# Patient Record
Sex: FEMALE | Race: WHITE | URBAN - METROPOLITAN AREA
[De-identification: names, ages, dates, MRNs, and addresses within clinical notes are randomized per-mention and may not be internally consistent; named-entity substitution may affect disease eponyms.]

---

## 2017-01-06 ENCOUNTER — TRANSFERRED RECORDS (OUTPATIENT)
Dept: HEALTH INFORMATION MANAGEMENT | Facility: CLINIC | Age: 52
End: 2017-01-06

## 2017-01-09 PROBLEM — E78.00 HYPERCHOLESTEREMIA: Status: ACTIVE | Noted: 2017-01-09

## 2017-01-09 PROBLEM — E66.9 OBESITY (BMI 30-39.9): Status: ACTIVE | Noted: 2017-01-09

## 2017-01-09 PROBLEM — C54.1 ENDOMETRIAL CANCER (H): Status: ACTIVE | Noted: 2017-01-09

## 2017-01-09 PROBLEM — Z86.73 H/O: CVA (CEREBROVASCULAR ACCIDENT): Status: ACTIVE | Noted: 2017-01-09

## 2017-01-09 PROBLEM — G43.909 MIGRAINE: Status: ACTIVE | Noted: 2017-01-09

## 2017-01-09 PROBLEM — K21.9 GERD (GASTROESOPHAGEAL REFLUX DISEASE): Status: ACTIVE | Noted: 2017-01-09

## 2017-01-09 PROBLEM — G47.33 SLEEP APNEA, OBSTRUCTIVE: Status: ACTIVE | Noted: 2017-01-09

## 2017-01-09 PROBLEM — F32.A DEPRESSION: Status: ACTIVE | Noted: 2017-01-09

## 2017-02-15 ENCOUNTER — TRANSFERRED RECORDS (OUTPATIENT)
Dept: HEALTH INFORMATION MANAGEMENT | Facility: CLINIC | Age: 52
End: 2017-02-15

## 2017-02-17 DIAGNOSIS — D50.9 IRON DEFICIENCY ANEMIA, UNSPECIFIED: Primary | ICD-10-CM

## 2017-02-17 PROBLEM — K90.9 INTESTINAL MALABSORPTION, UNSPECIFIED: Status: ACTIVE | Noted: 2017-02-17

## 2017-02-17 PROBLEM — T45.4X5A ADVERSE EFFECT OF IRON: Status: ACTIVE | Noted: 2017-02-17

## 2017-02-20 ENCOUNTER — INFUSION THERAPY VISIT (OUTPATIENT)
Dept: INFUSION THERAPY | Facility: CLINIC | Age: 52
End: 2017-02-20
Attending: PATHOLOGY
Payer: COMMERCIAL

## 2017-02-20 VITALS — TEMPERATURE: 98.8 F | DIASTOLIC BLOOD PRESSURE: 84 MMHG | SYSTOLIC BLOOD PRESSURE: 134 MMHG | HEART RATE: 66 BPM

## 2017-02-20 DIAGNOSIS — D50.9 IRON DEFICIENCY ANEMIA, UNSPECIFIED: Primary | ICD-10-CM

## 2017-02-20 DIAGNOSIS — K90.9 INTESTINAL MALABSORPTION, UNSPECIFIED: ICD-10-CM

## 2017-02-20 DIAGNOSIS — T45.4X5A ADVERSE EFFECT OF IRON: ICD-10-CM

## 2017-02-20 PROCEDURE — 96361 HYDRATE IV INFUSION ADD-ON: CPT

## 2017-02-20 PROCEDURE — 25000128 H RX IP 250 OP 636: Performed by: PATHOLOGY

## 2017-02-20 PROCEDURE — 96365 THER/PROPH/DIAG IV INF INIT: CPT

## 2017-02-20 RX ADMIN — FERRIC CARBOXYMALTOSE INJECTION 750 MG: 50 INJECTION, SOLUTION INTRAVENOUS at 12:19

## 2017-02-20 RX ADMIN — SODIUM CHLORIDE 250 ML: 9 INJECTION, SOLUTION INTRAVENOUS at 12:19

## 2017-02-20 ASSESSMENT — PAIN SCALES - GENERAL: PAINLEVEL: NO PAIN (0)

## 2017-02-20 NOTE — MR AVS SNAPSHOT
"              After Visit Summary   2/20/2017    Rosalia Schulz    MRN: [id number]           Patient Information     Date Of Birth          1965        Visit Information        Provider Department      2/20/2017 12:30 PM [facility] [address] [facility]        Today's Diagnoses     Iron deficiency anemia, unspecified    -  1    Intestinal malabsorption, unspecified        Adverse effect of iron           Follow-ups after your visit        Your next 10 appointments already scheduled     Feb 23, 2017   Procedure with Tiffanie Weldon MD   [facility] (--)    [address]  [facility] [address] [phone]              Who to contact     If you have questions or need follow up information about today's clinic visit or your schedule please contact [facility] AND [facility] directly at [phone].  Normal or non-critical lab and imaging results will be communicated to you by MyChart, letter or phone within 4 business days after the clinic has received the results. If you do not hear from us within 7 days, please contact the clinic through MyChart or phone. If you have a critical or abnormal lab result, we will notify you by phone as soon as possible.  Submit refill requests through [employer] or call your pharmacy and they will forward the refill request to us. Please allow 3 business days for your refill to be completed.          Additional Information About Your Visit        MyChart Information     [employer] lets you send messages to your doctor, view your test results, renew your prescriptions, schedule appointments and more. To sign up, go to [email or web address] . Click on \"Log in\" on the left side of the screen, which will take you to the Welcome page. Then click on \"Sign up Now\" on the right side of the page.     You will be asked to enter the access code listed below, as well as some personal information. Please follow " the directions to create your username and password.     Your access code is: [email or web address]  Expires: 2017  1:36 PM     Your access code will  in 90 days. If you need help or a new code, please call your [place] clinic or [phone].        Care EveryWhere ID     This is your Care EveryWhere ID. This could be used by other organizations to access your [place] medical records  [id number]        Your Vitals Were     Pulse Temperature                66 98.8  F (37.1  C)           Blood Pressure from Last 3 Encounters:   17 134/84    Weight from Last 3 Encounters:   No data found for Wt              Today, you had the following     No orders found for display       Primary Care Provider    None Specified       No primary provider on file.        Thank you!     Thank you for choosing [facility]  for your care. Our goal is always to provide you with excellent care. Hearing back from our patients is one way we can continue to improve our services. Please take a few minutes to complete the written survey that you may receive in the mail after your visit with us. Thank you!             Your Updated Medication List - Protect others around you: Learn how to safely use, store and throw away your medicines at www.disposemymeds.org.          This list is accurate as of: 17  1:36 PM.  Always use your most recent med list.                   Brand Name Dispense Instructions for use    ASPIRIN PO      Take 81 mg by mouth daily       ATORVASTATIN CALCIUM PO      Take 10 mg by mouth daily       OMEPRAZOLE PO      Take 20 mg by mouth every morning

## 2017-02-20 NOTE — PROGRESS NOTES
Infusion Nursing Note:  Rosalia Schulz presents today for injectafer infusion.    Patient seen by provider today: No   present during visit today: Not Applicable.    Note: N/A.    Intravenous Access:  Peripheral IV placed.    Treatment Conditions:  Not Applicable.      Post Infusion Assessment:  Patient tolerated infusion without incident.    Discharge Plan:   Patient and/or family verbalized understanding of discharge instructions and all questions answered.  Copy of AVS reviewed with patient and/or family.  Patient will return PRN for next appointment.  Patient discharged in stable condition accompanied by: self and .  Departure Mode: Ambulatory.  Face to Face time: 15 min.    Zoya Webb RN

## 2017-02-22 ENCOUNTER — ANESTHESIA EVENT (OUTPATIENT)
Dept: SURGERY | Facility: CLINIC | Age: 52
End: 2017-02-22
Payer: COMMERCIAL

## 2017-02-22 RX ORDER — FLUTICASONE PROPIONATE 50 MCG
2 SPRAY, SUSPENSION (ML) NASAL DAILY
Status: ON HOLD | COMMUNITY
End: 2017-03-06

## 2017-02-23 ENCOUNTER — ANESTHESIA (OUTPATIENT)
Dept: SURGERY | Facility: CLINIC | Age: 52
End: 2017-02-23
Payer: COMMERCIAL

## 2017-02-23 ENCOUNTER — HOSPITAL ENCOUNTER (OUTPATIENT)
Facility: CLINIC | Age: 52
Discharge: HOME | End: 2017-02-23
Attending: OBSTETRICS & GYNECOLOGY | Admitting: OBSTETRICS & GYNECOLOGY
Payer: COMMERCIAL

## 2017-02-23 VITALS
WEIGHT: 219.2 LBS | BODY MASS INDEX: 35.23 KG/M2 | RESPIRATION RATE: 16 BRPM | SYSTOLIC BLOOD PRESSURE: 124 MMHG | TEMPERATURE: 97.1 F | HEIGHT: 66 IN | HEART RATE: 67 BPM | DIASTOLIC BLOOD PRESSURE: 78 MMHG | OXYGEN SATURATION: 97 %

## 2017-02-23 DIAGNOSIS — C54.1 ENDOMETRIAL CANCER (H): Primary | ICD-10-CM

## 2017-02-23 LAB
ABO + RH BLD: NORMAL
ABO + RH BLD: NORMAL
BLD GP AB SCN SERPL QL: NORMAL
BLOOD BANK CMNT PATIENT-IMP: NORMAL
HCG SERPL QL: NEGATIVE
HGB BLD-MCNC: 12 G/DL (ref 11.7–15.7)
SPECIMEN EXP DATE BLD: NORMAL

## 2017-02-23 PROCEDURE — 86901 BLOOD TYPING SEROLOGIC RH(D): CPT | Performed by: ANESTHESIOLOGY

## 2017-02-23 PROCEDURE — 37000009 ZZH ANESTHESIA TECHNICAL FEE, EACH ADDTL 15 MIN: Performed by: OBSTETRICS & GYNECOLOGY

## 2017-02-23 PROCEDURE — 88331 PATH CONSLTJ SURG 1 BLK 1SPC: CPT | Mod: 26 | Performed by: OBSTETRICS & GYNECOLOGY

## 2017-02-23 PROCEDURE — 88341 IMHCHEM/IMCYTCHM EA ADD ANTB: CPT | Mod: 26,59 | Performed by: OBSTETRICS & GYNECOLOGY

## 2017-02-23 PROCEDURE — 88309 TISSUE EXAM BY PATHOLOGIST: CPT | Performed by: OBSTETRICS & GYNECOLOGY

## 2017-02-23 PROCEDURE — 88112 CYTOPATH CELL ENHANCE TECH: CPT | Performed by: OBSTETRICS & GYNECOLOGY

## 2017-02-23 PROCEDURE — 71000013 ZZH RECOVERY PHASE 1 LEVEL 1 EA ADDTL HR: Performed by: OBSTETRICS & GYNECOLOGY

## 2017-02-23 PROCEDURE — 88342 IMHCHEM/IMCYTCHM 1ST ANTB: CPT | Performed by: OBSTETRICS & GYNECOLOGY

## 2017-02-23 PROCEDURE — 84703 CHORIONIC GONADOTROPIN ASSAY: CPT | Performed by: OBSTETRICS & GYNECOLOGY

## 2017-02-23 PROCEDURE — 88341 IMHCHEM/IMCYTCHM EA ADD ANTB: CPT | Performed by: OBSTETRICS & GYNECOLOGY

## 2017-02-23 PROCEDURE — 88342 IMHCHEM/IMCYTCHM 1ST ANTB: CPT | Mod: 26,59 | Performed by: OBSTETRICS & GYNECOLOGY

## 2017-02-23 PROCEDURE — 88305 TISSUE EXAM BY PATHOLOGIST: CPT | Performed by: OBSTETRICS & GYNECOLOGY

## 2017-02-23 PROCEDURE — 88332 PATH CONSLTJ SURG EA ADD BLK: CPT | Performed by: OBSTETRICS & GYNECOLOGY

## 2017-02-23 PROCEDURE — 25000128 H RX IP 250 OP 636: Performed by: NURSE ANESTHETIST, CERTIFIED REGISTERED

## 2017-02-23 PROCEDURE — 25000128 H RX IP 250 OP 636: Performed by: ANESTHESIOLOGY

## 2017-02-23 PROCEDURE — 36000085 ZZH SURGERY LEVEL 8 1ST 30 MIN: Performed by: OBSTETRICS & GYNECOLOGY

## 2017-02-23 PROCEDURE — 88332 PATH CONSLTJ SURG EA ADD BLK: CPT | Mod: 26 | Performed by: OBSTETRICS & GYNECOLOGY

## 2017-02-23 PROCEDURE — 88307 TISSUE EXAM BY PATHOLOGIST: CPT | Mod: 26 | Performed by: OBSTETRICS & GYNECOLOGY

## 2017-02-23 PROCEDURE — 00000155 ZZHCL STATISTIC H-CELL BLOCK W/STAIN: Performed by: OBSTETRICS & GYNECOLOGY

## 2017-02-23 PROCEDURE — 25800025 ZZH RX 258: Performed by: NURSE ANESTHETIST, CERTIFIED REGISTERED

## 2017-02-23 PROCEDURE — 36415 COLL VENOUS BLD VENIPUNCTURE: CPT | Performed by: OBSTETRICS & GYNECOLOGY

## 2017-02-23 PROCEDURE — 85018 HEMOGLOBIN: CPT | Performed by: ANESTHESIOLOGY

## 2017-02-23 PROCEDURE — 71000012 ZZH RECOVERY PHASE 1 LEVEL 1 FIRST HR: Performed by: OBSTETRICS & GYNECOLOGY

## 2017-02-23 PROCEDURE — 27210794 ZZH OR GENERAL SUPPLY STERILE: Performed by: OBSTETRICS & GYNECOLOGY

## 2017-02-23 PROCEDURE — 88307 TISSUE EXAM BY PATHOLOGIST: CPT | Performed by: OBSTETRICS & GYNECOLOGY

## 2017-02-23 PROCEDURE — 25000125 ZZHC RX 250: Performed by: NURSE ANESTHETIST, CERTIFIED REGISTERED

## 2017-02-23 PROCEDURE — 86850 RBC ANTIBODY SCREEN: CPT | Performed by: ANESTHESIOLOGY

## 2017-02-23 PROCEDURE — 25000566 ZZH SEVOFLURANE, EA 15 MIN: Performed by: OBSTETRICS & GYNECOLOGY

## 2017-02-23 PROCEDURE — 25000125 ZZHC RX 250: Performed by: SURGERY

## 2017-02-23 PROCEDURE — 88305 TISSUE EXAM BY PATHOLOGIST: CPT | Mod: 26 | Performed by: OBSTETRICS & GYNECOLOGY

## 2017-02-23 PROCEDURE — 25000132 ZZH RX MED GY IP 250 OP 250 PS 637: Performed by: NURSE PRACTITIONER

## 2017-02-23 PROCEDURE — 88305 TISSUE EXAM BY PATHOLOGIST: CPT | Mod: 26,59 | Performed by: OBSTETRICS & GYNECOLOGY

## 2017-02-23 PROCEDURE — 40000170 ZZH STATISTIC PRE-PROCEDURE ASSESSMENT II: Performed by: OBSTETRICS & GYNECOLOGY

## 2017-02-23 PROCEDURE — 88331 PATH CONSLTJ SURG 1 BLK 1SPC: CPT | Performed by: OBSTETRICS & GYNECOLOGY

## 2017-02-23 PROCEDURE — 86900 BLOOD TYPING SEROLOGIC ABO: CPT | Performed by: ANESTHESIOLOGY

## 2017-02-23 PROCEDURE — 25000128 H RX IP 250 OP 636: Performed by: SURGERY

## 2017-02-23 PROCEDURE — 25000125 ZZHC RX 250: Performed by: OBSTETRICS & GYNECOLOGY

## 2017-02-23 PROCEDURE — 88309 TISSUE EXAM BY PATHOLOGIST: CPT | Mod: 26 | Performed by: OBSTETRICS & GYNECOLOGY

## 2017-02-23 PROCEDURE — 71000027 ZZH RECOVERY PHASE 2 EACH 15 MINS: Performed by: OBSTETRICS & GYNECOLOGY

## 2017-02-23 PROCEDURE — 25000128 H RX IP 250 OP 636: Performed by: OBSTETRICS & GYNECOLOGY

## 2017-02-23 PROCEDURE — 36000087 ZZH SURGERY LEVEL 8 EA 15 ADDTL MIN: Performed by: OBSTETRICS & GYNECOLOGY

## 2017-02-23 PROCEDURE — 37000008 ZZH ANESTHESIA TECHNICAL FEE, 1ST 30 MIN: Performed by: OBSTETRICS & GYNECOLOGY

## 2017-02-23 PROCEDURE — 25000132 ZZH RX MED GY IP 250 OP 250 PS 637: Performed by: OBSTETRICS & GYNECOLOGY

## 2017-02-23 PROCEDURE — 88112 CYTOPATH CELL ENHANCE TECH: CPT | Mod: 26 | Performed by: OBSTETRICS & GYNECOLOGY

## 2017-02-23 RX ORDER — ONDANSETRON 4 MG/1
4 TABLET, ORALLY DISINTEGRATING ORAL EVERY 30 MIN PRN
Status: DISCONTINUED | OUTPATIENT
Start: 2017-02-23 | End: 2017-02-23 | Stop reason: HOSPADM

## 2017-02-23 RX ORDER — IBUPROFEN 600 MG/1
600 TABLET, FILM COATED ORAL EVERY 6 HOURS PRN
Qty: 30 TABLET | Refills: 0 | Status: SHIPPED | OUTPATIENT
Start: 2017-02-23

## 2017-02-23 RX ORDER — FENTANYL CITRATE 0.05 MG/ML
25-50 INJECTION, SOLUTION INTRAMUSCULAR; INTRAVENOUS
Status: DISCONTINUED | OUTPATIENT
Start: 2017-02-23 | End: 2017-02-23 | Stop reason: HOSPADM

## 2017-02-23 RX ORDER — OXYCODONE AND ACETAMINOPHEN 5; 325 MG/1; MG/1
1-2 TABLET ORAL EVERY 4 HOURS PRN
Qty: 30 TABLET | Refills: 0 | Status: ON HOLD | OUTPATIENT
Start: 2017-02-23 | End: 2017-03-06

## 2017-02-23 RX ORDER — GLYCOPYRROLATE 0.2 MG/ML
INJECTION, SOLUTION INTRAMUSCULAR; INTRAVENOUS PRN
Status: DISCONTINUED | OUTPATIENT
Start: 2017-02-23 | End: 2017-02-23

## 2017-02-23 RX ORDER — FENTANYL CITRATE 50 UG/ML
INJECTION, SOLUTION INTRAMUSCULAR; INTRAVENOUS PRN
Status: DISCONTINUED | OUTPATIENT
Start: 2017-02-23 | End: 2017-02-23

## 2017-02-23 RX ORDER — NALOXONE HYDROCHLORIDE 0.4 MG/ML
.1-.4 INJECTION, SOLUTION INTRAMUSCULAR; INTRAVENOUS; SUBCUTANEOUS
Status: DISCONTINUED | OUTPATIENT
Start: 2017-02-23 | End: 2017-02-23 | Stop reason: HOSPADM

## 2017-02-23 RX ORDER — MEPERIDINE HYDROCHLORIDE 25 MG/ML
12.5 INJECTION INTRAMUSCULAR; INTRAVENOUS; SUBCUTANEOUS
Status: DISCONTINUED | OUTPATIENT
Start: 2017-02-23 | End: 2017-02-23 | Stop reason: HOSPADM

## 2017-02-23 RX ORDER — EPHEDRINE SULFATE 50 MG/ML
INJECTION, SOLUTION INTRAMUSCULAR; INTRAVENOUS; SUBCUTANEOUS PRN
Status: DISCONTINUED | OUTPATIENT
Start: 2017-02-23 | End: 2017-02-23

## 2017-02-23 RX ORDER — SODIUM CHLORIDE, SODIUM LACTATE, POTASSIUM CHLORIDE, CALCIUM CHLORIDE 600; 310; 30; 20 MG/100ML; MG/100ML; MG/100ML; MG/100ML
INJECTION, SOLUTION INTRAVENOUS CONTINUOUS
Status: DISCONTINUED | OUTPATIENT
Start: 2017-02-23 | End: 2017-02-23 | Stop reason: HOSPADM

## 2017-02-23 RX ORDER — CEFAZOLIN SODIUM 1 G/3ML
1 INJECTION, POWDER, FOR SOLUTION INTRAMUSCULAR; INTRAVENOUS SEE ADMIN INSTRUCTIONS
Status: DISCONTINUED | OUTPATIENT
Start: 2017-02-23 | End: 2017-02-23 | Stop reason: HOSPADM

## 2017-02-23 RX ORDER — PROPOFOL 10 MG/ML
INJECTION, EMULSION INTRAVENOUS PRN
Status: DISCONTINUED | OUTPATIENT
Start: 2017-02-23 | End: 2017-02-23

## 2017-02-23 RX ORDER — DEXMEDETOMIDINE HYDROCHLORIDE 100 UG/ML
INJECTION, SOLUTION INTRAVENOUS CONTINUOUS PRN
Status: DISCONTINUED | OUTPATIENT
Start: 2017-02-23 | End: 2017-02-23

## 2017-02-23 RX ORDER — ACETAMINOPHEN 10 MG/ML
1000 INJECTION, SOLUTION INTRAVENOUS ONCE
Status: COMPLETED | OUTPATIENT
Start: 2017-02-23 | End: 2017-02-23

## 2017-02-23 RX ORDER — AMOXICILLIN 250 MG
1-2 CAPSULE ORAL 2 TIMES DAILY
Qty: 30 TABLET | Refills: 0 | Status: ON HOLD | OUTPATIENT
Start: 2017-02-23 | End: 2017-03-06

## 2017-02-23 RX ORDER — PROPOFOL 10 MG/ML
INJECTION, EMULSION INTRAVENOUS CONTINUOUS PRN
Status: DISCONTINUED | OUTPATIENT
Start: 2017-02-23 | End: 2017-02-23

## 2017-02-23 RX ORDER — CEFAZOLIN SODIUM 2 G/100ML
2 INJECTION, SOLUTION INTRAVENOUS
Status: COMPLETED | OUTPATIENT
Start: 2017-02-23 | End: 2017-02-23

## 2017-02-23 RX ORDER — MULTIPLE VITAMINS W/ MINERALS TAB 9MG-400MCG
1 TAB ORAL DAILY
COMMUNITY

## 2017-02-23 RX ORDER — OXYCODONE AND ACETAMINOPHEN 5; 325 MG/1; MG/1
1-2 TABLET ORAL
Status: COMPLETED | OUTPATIENT
Start: 2017-02-23 | End: 2017-02-23

## 2017-02-23 RX ORDER — SODIUM CHLORIDE, SODIUM LACTATE, POTASSIUM CHLORIDE, CALCIUM CHLORIDE 600; 310; 30; 20 MG/100ML; MG/100ML; MG/100ML; MG/100ML
INJECTION, SOLUTION INTRAVENOUS CONTINUOUS PRN
Status: DISCONTINUED | OUTPATIENT
Start: 2017-02-23 | End: 2017-02-23

## 2017-02-23 RX ORDER — BUPIVACAINE HYDROCHLORIDE AND EPINEPHRINE 5; 5 MG/ML; UG/ML
INJECTION, SOLUTION PERINEURAL PRN
Status: DISCONTINUED | OUTPATIENT
Start: 2017-02-23 | End: 2017-02-23 | Stop reason: HOSPADM

## 2017-02-23 RX ORDER — LABETALOL HYDROCHLORIDE 5 MG/ML
10 INJECTION, SOLUTION INTRAVENOUS
Status: DISCONTINUED | OUTPATIENT
Start: 2017-02-23 | End: 2017-02-23 | Stop reason: HOSPADM

## 2017-02-23 RX ORDER — ONDANSETRON 2 MG/ML
INJECTION INTRAMUSCULAR; INTRAVENOUS PRN
Status: DISCONTINUED | OUTPATIENT
Start: 2017-02-23 | End: 2017-02-23

## 2017-02-23 RX ORDER — KETOROLAC TROMETHAMINE 30 MG/ML
30 INJECTION, SOLUTION INTRAMUSCULAR; INTRAVENOUS ONCE
Status: COMPLETED | OUTPATIENT
Start: 2017-02-23 | End: 2017-02-23

## 2017-02-23 RX ORDER — IBUPROFEN 600 MG/1
600 TABLET, FILM COATED ORAL
Status: DISCONTINUED | OUTPATIENT
Start: 2017-02-23 | End: 2017-02-23 | Stop reason: HOSPADM

## 2017-02-23 RX ORDER — OXYCODONE HYDROCHLORIDE 5 MG/1
5 TABLET ORAL ONCE
Status: COMPLETED | OUTPATIENT
Start: 2017-02-23 | End: 2017-02-23

## 2017-02-23 RX ORDER — ONDANSETRON 4 MG/1
4 TABLET, ORALLY DISINTEGRATING ORAL
Status: DISCONTINUED | OUTPATIENT
Start: 2017-02-23 | End: 2017-02-23 | Stop reason: HOSPADM

## 2017-02-23 RX ORDER — DEXAMETHASONE SODIUM PHOSPHATE 4 MG/ML
INJECTION, SOLUTION INTRA-ARTICULAR; INTRALESIONAL; INTRAMUSCULAR; INTRAVENOUS; SOFT TISSUE PRN
Status: DISCONTINUED | OUTPATIENT
Start: 2017-02-23 | End: 2017-02-23

## 2017-02-23 RX ORDER — LIDOCAINE HYDROCHLORIDE 20 MG/ML
INJECTION, SOLUTION INFILTRATION; PERINEURAL PRN
Status: DISCONTINUED | OUTPATIENT
Start: 2017-02-23 | End: 2017-02-23

## 2017-02-23 RX ORDER — NEOSTIGMINE METHYLSULFATE 1 MG/ML
VIAL (ML) INJECTION PRN
Status: DISCONTINUED | OUTPATIENT
Start: 2017-02-23 | End: 2017-02-23

## 2017-02-23 RX ORDER — ONDANSETRON 2 MG/ML
4 INJECTION INTRAMUSCULAR; INTRAVENOUS EVERY 30 MIN PRN
Status: DISCONTINUED | OUTPATIENT
Start: 2017-02-23 | End: 2017-02-23 | Stop reason: HOSPADM

## 2017-02-23 RX ADMIN — LIDOCAINE HYDROCHLORIDE 80 MG: 20 INJECTION, SOLUTION INFILTRATION; PERINEURAL at 09:45

## 2017-02-23 RX ADMIN — FENTANYL CITRATE 150 MCG: 50 INJECTION, SOLUTION INTRAMUSCULAR; INTRAVENOUS at 10:11

## 2017-02-23 RX ADMIN — HYDROMORPHONE HYDROCHLORIDE 0.5 MG: 1 INJECTION, SOLUTION INTRAMUSCULAR; INTRAVENOUS; SUBCUTANEOUS at 12:58

## 2017-02-23 RX ADMIN — SUCCINYLCHOLINE CHLORIDE 100 MG: 20 INJECTION, SOLUTION INTRAMUSCULAR; INTRAVENOUS at 09:45

## 2017-02-23 RX ADMIN — GLYCOPYRROLATE 0.8 MG: 0.2 INJECTION, SOLUTION INTRAMUSCULAR; INTRAVENOUS at 11:54

## 2017-02-23 RX ADMIN — KETOROLAC TROMETHAMINE 30 MG: 30 INJECTION, SOLUTION INTRAMUSCULAR at 13:17

## 2017-02-23 RX ADMIN — DEXMEDETOMIDINE HYDROCHLORIDE 0.5 MCG/KG/HR: 4 INJECTION, SOLUTION INTRAVENOUS at 09:45

## 2017-02-23 RX ADMIN — PROPOFOL 200 MG: 10 INJECTION, EMULSION INTRAVENOUS at 09:45

## 2017-02-23 RX ADMIN — FENTANYL CITRATE 25 MCG: 50 INJECTION, SOLUTION INTRAMUSCULAR; INTRAVENOUS at 11:47

## 2017-02-23 RX ADMIN — ROCURONIUM BROMIDE 50 MG: 10 INJECTION INTRAVENOUS at 09:54

## 2017-02-23 RX ADMIN — OXYCODONE HYDROCHLORIDE 5 MG: 5 TABLET ORAL at 15:48

## 2017-02-23 RX ADMIN — PROPOFOL 50 MCG/KG/MIN: 10 INJECTION, EMULSION INTRAVENOUS at 09:45

## 2017-02-23 RX ADMIN — MIDAZOLAM HYDROCHLORIDE 2 MG: 1 INJECTION, SOLUTION INTRAMUSCULAR; INTRAVENOUS at 09:45

## 2017-02-23 RX ADMIN — OXYCODONE HYDROCHLORIDE AND ACETAMINOPHEN 1 TABLET: 5; 325 TABLET ORAL at 14:38

## 2017-02-23 RX ADMIN — DEXAMETHASONE SODIUM PHOSPHATE 4 MG: 4 INJECTION, SOLUTION INTRAMUSCULAR; INTRAVENOUS at 09:49

## 2017-02-23 RX ADMIN — SODIUM CHLORIDE, POTASSIUM CHLORIDE, SODIUM LACTATE AND CALCIUM CHLORIDE: 600; 310; 30; 20 INJECTION, SOLUTION INTRAVENOUS at 09:45

## 2017-02-23 RX ADMIN — SODIUM CHLORIDE, POTASSIUM CHLORIDE, SODIUM LACTATE AND CALCIUM CHLORIDE: 600; 310; 30; 20 INJECTION, SOLUTION INTRAVENOUS at 09:53

## 2017-02-23 RX ADMIN — HYDROMORPHONE HYDROCHLORIDE 0.5 MG: 1 INJECTION, SOLUTION INTRAMUSCULAR; INTRAVENOUS; SUBCUTANEOUS at 12:40

## 2017-02-23 RX ADMIN — ONDANSETRON 4 MG: 2 INJECTION INTRAMUSCULAR; INTRAVENOUS at 11:42

## 2017-02-23 RX ADMIN — Medication 5 MG: at 10:10

## 2017-02-23 RX ADMIN — NEOSTIGMINE METHYLSULFATE 5 MG: 1 INJECTION INTRAMUSCULAR; INTRAVENOUS; SUBCUTANEOUS at 11:54

## 2017-02-23 RX ADMIN — CEFAZOLIN SODIUM 2 G: 2 INJECTION, SOLUTION INTRAVENOUS at 09:49

## 2017-02-23 RX ADMIN — ACETAMINOPHEN 1000 MG: 10 INJECTION, SOLUTION INTRAVENOUS at 13:11

## 2017-02-23 RX ADMIN — ROCURONIUM BROMIDE 30 MG: 10 INJECTION INTRAVENOUS at 10:10

## 2017-02-23 RX ADMIN — Medication 5 MG: at 10:06

## 2017-02-23 RX ADMIN — FENTANYL CITRATE 100 MCG: 50 INJECTION, SOLUTION INTRAMUSCULAR; INTRAVENOUS at 09:45

## 2017-02-23 NOTE — IP AVS SNAPSHOT
MRN:[id number]                      After Visit Summary   2/23/2017    Rosalia Schulz    MRN: [id number]           Thank you!     Thank you for choosing [facility] for your care. Our goal is always to provide you with excellent care. Hearing back from our patients is one way we can continue to improve our services. Please take a few minutes to complete the written survey that you may receive in the mail after you visit with us. Thank you!        Patient Information     Date Of Birth          1965        About your hospital stay     You were admitted on:  February 23, 2017 You last received care in the:  [facility] PACU    You were discharged on:  February 23, 2017       Who to Call     For medical emergencies, please call 911.  For non-urgent questions about your medical care, please call your primary care provider or clinic, [phone]  For questions related to your surgery, please call your surgery clinic        Attending Provider     Provider Specialty    Tiffanie Weldon MD Oncology       Primary Care Provider Office Phone # Fax #    Joby Hammonds PA-C [phone]       [facility] [address]  [facility] [address]        After Care Instructions     Discharge Instructions       Discharge instructions following your gynecologic procedure:  Returning to work/activities:  -Nothing per vagina for 8 weeks  -Typically patients can expect to return to light work within 2-4 weeks.  -No driving or operating machinery while taking prescription pain medication.  -You should avoid heavy lifting until your follow up visit. No lifting greater than 20 pounds for 2 weeks.     Diet:  -as tolerated    Pain:  -Motrin (or other NSAIDs) are a good additional therapy and recommend trying this in addition to other pain relievers.  -Typically there is a minimal to moderate amount of incisional pain after surgery.  - An ice pack is recommended intermittently for the  first 48 hours to help reduce pain & swelling.  -Most patients are provided a prescription for medication to take on a short term basis to help relieve the discomfort.  -If pain medication refills are needed we require you contact our office during regular business hours. (8am-5pm Monday-Friday)  -Please be aware that pain medication can cause constipation.  It may be recommended to take an over the counter stool softener as directed to prevent constipation.     Constipation:  -The pain medication you are prescribed at the time of your surgery can cause constipation.   -We recommend that you take an over the counter stool softener such as colace or senokot-s on a regular basis until you have stopped the pain medication or bowel movements are regular. You make take 1-4 tablets twice per day.   -For constipation lasting 3 days please take Milk of Magnesia or Miralax as directed on the bottles. If you have taken Milk of Magnesia and Miralax and still have not had a bowel movement please contact your our office.    Incision/Wound care:  -Leave your steri-strips in place until your post op visit.   -If you have a clear plastic dressing over a gauze on your incision, remove these 1-2 days after discharging from the hospital.   -You many shower 24hrs after surgery.  It is ok to get the steri-strips/incision wet while showering & pat the area dry with a clean towel  -No bathtubs, hot tubs or swimming is recommended for at least 2 weeks.    Vaginal drainage/spotting:  -Following a hysterectomy you may have vaginal drainage/spotting for up to 4-6 weeks from surgery. If more than a regular period please call our office.     Bladder symptoms:  -You may also have bladder irritation of difficulty starting urination from your surgery and this is normal. Please call if you have pain or burning when you urinate or fevers.     Follow up care:  -You will be asked to see Dr. Weldon's Nurse Practitioner in 1 week and Dr. Weldon in 8  weeks.   -If you don't already have an appointment, please contact the office and our staff will happily assist you in scheduling your appointment. Bring your insurance card & government issued ID card to your appointment.    CALL YOUR SURGEON @[phone] FOR ANY OF THE FOLLOWING:  -Temperature greater than 101 degrees Fahrenheit  -Increased pain  -Increased shortness of breath  -Increased bleeding or drainage or vaginal bleeding greater than a regular menses.   -Pus-like drainage, increasing redness, swelling, tenderness, or warmth at the incision site  -Persistent nausea or vomiting                  Further instructions from your care team       Same Day Surgery Discharge Instructions for  Sedation and General Anesthesia       It's not unusual to feel dizzy, light-headed or faint for up to 24 hours after surgery or while taking pain medication.  If you have these symptoms: sit for a few minutes before standing and have someone assist you when you get up to walk or use the bathroom.      You should rest and relax for the next 24 hours. We recommend you make arrangements to have an adult stay with you for at least 24 hours after your discharge.  Avoid hazardous and strenuous activity.      DO NOT DRIVE any vehicle or operate mechanical equipment for 24 hours following the end of your surgery.  Even though you may feel normal, your reactions may be affected by the medication you have received.      Do not drink alcoholic beverages for 24 hours following surgery.       Slowly progress to your regular diet as you feel able. It's not unusual to feel nauseated and/or vomit after receiving anesthesia.  If you develop these symptoms, drink clear liquids (apple juice, ginger ale, broth, 7-up, etc. ) until you feel better.  If your nausea and vomiting persists for 24 hours, please notify your surgeon.        All narcotic pain medications, along with inactivity and anesthesia, can cause constipation. Drinking plenty of  liquids and increasing fiber intake will help.      For any questions of a medical nature, call your surgeon.      Do not make important decisions for 24 hours.      If you had general anesthesia, you may have a sore throat for a couple of days related to the breathing tube used during surgery.  You may use Cepacol lozenges to help with this discomfort.  If it worsens or if you develop a fever, contact your surgeon.       If you feel your pain is not well managed with the pain medications prescribed by your surgeon, please contact your surgeon's office to let them know so they can address your concerns.     **If you have questions or concerns about your procedure,   call Dr. Weldon [phone]**      Discharge Instructions for Laparoscopic or Davinci Hysterectomy    Incisional Care  A small amount of drainage from your incisions is normal. You may wear Band Aids until the drainage stops. The day after surgery, if your incisions are dry, remove the Band Aids. Leave the Steri-Strips (small pieces of tape) in place. Let them fall off on their own, or gently remove them after 7 days.  Once your have removed your Band Aids, you may shower as usual. Wash your incisions with mild soap and water. Pat dry.  Don't use oils, powders, or lotions on your incisions.  General Care  Take your medications exactly as directed by your doctor.    You may have vaginal bleeding that can last for several weeks. Use pads only. Don't put anything in your vagina (tampons, douches or have sexual intercourse) until your doctor says it's safe to do so.  Avoid constipation.  Eat fruits, vegetables, and whole grains.  Drink 6-8 glasses of water a day, unless told to do otherwise.  Use a laxative or a mild stool softener if your doctor says it's okay.  Activity  Don't drive while you are still taking narcotic pain medications.  Don t do strenuous activities until the doctor says it's okay.  Walk as often as you feel able.    Pain  Your incisions  "may be tender or sore. You may also have pain in your upper back or shoulders. This is from the gas used to enlarge your abdomen to allow your doctor to see inside your pelvis and perform the procedure. This pain usually goes away in a day or two.   When to Call Your Doctor  Call your doctor right away if you have any of the following:  Fever above 100.4 F (38 C) or chills  Vaginal bleeding that soaks more than one sanitary pad per hour  A foul smelling discharge from the vagina  Difficulty urinating  Severe pain   Redness, swelling, or drainage at your incision sites  Follow-Up  Make a follow-up appointment as directed by your surgeon.      While you were at the hospital today you were given 1000 mg of Tylenol. The recommended daily maximum dose is 4000 mg.     While you were at the hospital today you received Toradol, an antiinflammatory medication similar to Ibuprofen.  You should not take other antiinflammatory medication, such as Ibuprofen, Motrin, Advil, Aleve, Naprosyn, etc, until  7:20PM     **Because you had anesthesia today and your history of sleep apnea, it is extremely important that you use your CPAP machine for the next 24 hours while napping or sleeping.**      Pending Results     Date and Time Order Name Status Description    2/23/2017 1046 Surgical pathology exam In process     2/23/2017 1016 Cytology non gyn In process             Admission Information     Date & Time Provider Department Dept. Phone    2/23/2017 Tiffanie Weldon MD [facility] PACU [phone]      Your Vitals Were     Blood Pressure Temperature Respirations Height Weight Pulse Oximetry    118/67 96.8  F (36  C) (Temporal) 12 1.676 m (5' 6\") 99.4 kg (219 lb 3.2 oz) 92%    BMI (Body Mass Index)                   35.38 kg/m2           MyChart Information     [employer] lets you send messages to your doctor, view your test results, renew your prescriptions, schedule appointments and more. To sign up, go to " "[email or web address] . Click on \"Log in\" on the left side of the screen, which will take you to the Welcome page. Then click on \"Sign up Now\" on the right side of the page.     You will be asked to enter the access code listed below, as well as some personal information. Please follow the directions to create your username and password.     Your access code is: [email or web address]  Expires: 2017  1:36 PM     Your access code will  in 90 days. If you need help or a new code, please call your [place] clinic or [phone].        Care EveryWhere ID     This is your Care EveryWhere ID. This could be used by other organizations to access your [place] medical records  [id number]           Review of your medicines      START taking        Dose / Directions    oxyCODONE-acetaminophen 5-325 MG per tablet   Commonly known as:  PERCOCET   Used for:  Endometrial cancer (H)        Dose:  1-2 tablet   Take 1-2 tablets by mouth every 4 hours as needed for pain (moderate to severe)   Quantity:  30 tablet   Refills:  0       senna-docusate 8.6-50 MG per tablet   Commonly known as:  SENOKOT-S;PERICOLACE   Used for:  Endometrial cancer (H)        Dose:  1-2 tablet   Take 1-2 tablets by mouth 2 times daily Take while on oral narcotics to prevent or treat constipation.   Quantity:  30 tablet   Refills:  0         CONTINUE these medicines which may have CHANGED, or have new prescriptions. If we are uncertain of the size of tablets/capsules you have at home, strength may be listed as something that might have changed.        Dose / Directions    ibuprofen 600 MG tablet   Commonly known as:  ADVIL/MOTRIN   This may have changed:    - medication strength  - how much to take  - when to take this  - reasons to take this   Used for:  Endometrial cancer (H)        Dose:  600 mg   Take 1 tablet (600 mg) by mouth every 6 hours as needed for pain (mild)   Quantity:  30 tablet   Refills:  0         CONTINUE these medicines which have " NOT CHANGED        Dose / Directions    ASPIRIN PO   Indication:  Cerebrovascular Accident or Stroke        Dose:  81 mg   Take 81 mg by mouth daily   Refills:  0       ATORVASTATIN CALCIUM PO        Dose:  10 mg   Take 10 mg by mouth daily   Refills:  0       fluticasone 50 MCG/ACT spray   Commonly known as:  FLONASE        Dose:  2 spray   Spray 2 sprays into both nostrils daily   Refills:  0       IMITREX PO        Dose:  50 mg   Take 50 mg by mouth May repeat after two hours.  Maximum dose 200 mg/24 hours   Refills:  0       Multi-vitamin Tabs tablet        Dose:  1 tablet   Take 1 tablet by mouth daily   Refills:  0       OMEPRAZOLE PO        Dose:  20 mg   Take 20 mg by mouth every morning   Refills:  0       PROZAC PO        Dose:  40 mg   Take 40 mg by mouth daily   Refills:  0            Where to get your medicines      Some of these will need a paper prescription and others can be bought over the counter. Ask your nurse if you have questions.     Bring a paper prescription for each of these medications     ibuprofen 600 MG tablet    oxyCODONE-acetaminophen 5-325 MG per tablet    senna-docusate 8.6-50 MG per tablet                Protect others around you: Learn how to safely use, store and throw away your medicines at www.disposemymeds.org.             Medication List: This is a list of all your medications and when to take them. Check marks below indicate your daily home schedule. Keep this list as a reference.      Medications           Morning Afternoon Evening Bedtime As Needed    ASPIRIN PO   Take 81 mg by mouth daily                                ATORVASTATIN CALCIUM PO   Take 10 mg by mouth daily                                fluticasone 50 MCG/ACT spray   Commonly known as:  FLONASE   Spray 2 sprays into both nostrils daily                                ibuprofen 600 MG tablet   Commonly known as:  ADVIL/MOTRIN   Take 1 tablet (600 mg) by mouth every 6 hours as needed for pain (mild)                                 IMITREX PO   Take 50 mg by mouth May repeat after two hours.  Maximum dose 200 mg/24 hours                                Multi-vitamin Tabs tablet   Take 1 tablet by mouth daily                                OMEPRAZOLE PO   Take 20 mg by mouth every morning                                oxyCODONE-acetaminophen 5-325 MG per tablet   Commonly known as:  PERCOCET   Take 1-2 tablets by mouth every 4 hours as needed for pain (moderate to severe)   Last time this was given:  1 tablet on 2/23/2017  2:38 PM                                PROZAC PO   Take 40 mg by mouth daily                                senna-docusate 8.6-50 MG per tablet   Commonly known as:  SENOKOT-S;PERICOLACE   Take 1-2 tablets by mouth 2 times daily Take while on oral narcotics to prevent or treat constipation.

## 2017-02-23 NOTE — ANESTHESIA POSTPROCEDURE EVALUATION
Patient: Rosalia Schulz    Procedure(s):  ROBOTIC ASSISTED TOTAL LAPAROSCOPIC HYSTERECTOMY, BILATERAL SALPINGOO-OOPHORECTOMY, LYMPHADENECTOMY, BILATERAL     - Wound Class: II-Clean Contaminated   - Wound Class: II-Clean Contaminated    Diagnosis:ENDOMETRIAL CANCER  Diagnosis Additional Information: No value filed.    Anesthesia Type:  General, RSI, ETT    Note:  Anesthesia Post Evaluation    Patient location during evaluation: PACU  Patient participation: Able to fully participate in evaluation  Level of consciousness: awake and alert  Pain management: satisfactory to patient  Airway patency: patent  Cardiovascular status: hemodynamically stable  Respiratory status: acceptable and unassisted  Hydration status: balanced  PONV: none     Anesthetic complications: None          Last vitals:  Vitals:    02/23/17 1430 02/23/17 1445 02/23/17 1510   BP: 118/67 121/72    Resp: 12 14    Temp:  36.2  C (97.1  F)    SpO2: 92% 93% 95%         Electronically Signed By: Barron King MD  February 23, 2017  4:06 PM

## 2017-02-23 NOTE — IP AVS SNAPSHOT
[address], [address]    [facility] [address]    Phone:  [phone]                                       After Visit Summary   2/23/2017    Rosalia Schulz    MRN: [id number]           After Visit Summary Signature Page     I have received my discharge instructions, and my questions have been answered. I have discussed any challenges I see with this plan with the nurse or doctor.    ..........................................................................................................................................  Patient/Patient Representative Signature      ..........................................................................................................................................  Patient Representative Print Name and Relationship to Patient    ..................................................               ................................................  Date                                            Time    ..........................................................................................................................................  Reviewed by Signature/Title    ...................................................              ..............................................  Date                                                            Time

## 2017-02-23 NOTE — PROCEDURES
POST OPERATIVE NOTE-IMMEDIATE :  Preoperative Diagnosis:  ENDOMETRIAL CANCER    Postoperative Diagnosis:  Same      NATIONAL GUIDELINE REFERENCED FOR TREATMENT PLANNING:NCCN    Procedures:  Robotic assisted laparoscopic total hysterectomy  Bilateral salphingoophorectomy  Bilateral pelvic lymphadenectomy    Prosthetic Devices:  None    Surgeon(s) and Assistants (if any):  Surgeon(s):  Tiffanie Weldon MD  Circulator: Jane Rivas RN  Nurse Practitioner: Jessica Sahni APRN CNP  Relief Circulator: Breanne Segura RN  Scrub Person: Dari Hoff; Kenyatta Contreras    Anesthesia:  General    Drains:  none    Specimens:  Uterus, cervix, bilateral fallopian tubes and ovaries, bilateral pelvic lymph nodes, washings, right residual cervix.     Complications: none    Findings/Conclusions:  7.7cm tumor on frozen with less than 50% invasion, grade 1, grossly normal appearing ovaries and nodes, RLQ incisional hematoma.     Estimated Blood Loss:  25cc    Condition on discharge from OR:  Satisfactory      Jessica Sahni   On Behalf of  Surgeon(s):  Tiffanie Weldon MD

## 2017-02-23 NOTE — DISCHARGE INSTRUCTIONS
Same Day Surgery Discharge Instructions for  Sedation and General Anesthesia       It's not unusual to feel dizzy, light-headed or faint for up to 24 hours after surgery or while taking pain medication.  If you have these symptoms: sit for a few minutes before standing and have someone assist you when you get up to walk or use the bathroom.      You should rest and relax for the next 24 hours. We recommend you make arrangements to have an adult stay with you for at least 24 hours after your discharge.  Avoid hazardous and strenuous activity.      DO NOT DRIVE any vehicle or operate mechanical equipment for 24 hours following the end of your surgery.  Even though you may feel normal, your reactions may be affected by the medication you have received.      Do not drink alcoholic beverages for 24 hours following surgery.       Slowly progress to your regular diet as you feel able. It's not unusual to feel nauseated and/or vomit after receiving anesthesia.  If you develop these symptoms, drink clear liquids (apple juice, ginger ale, broth, 7-up, etc. ) until you feel better.  If your nausea and vomiting persists for 24 hours, please notify your surgeon.        All narcotic pain medications, along with inactivity and anesthesia, can cause constipation. Drinking plenty of liquids and increasing fiber intake will help.      For any questions of a medical nature, call your surgeon.      Do not make important decisions for 24 hours.      If you had general anesthesia, you may have a sore throat for a couple of days related to the breathing tube used during surgery.  You may use Cepacol lozenges to help with this discomfort.  If it worsens or if you develop a fever, contact your surgeon.       If you feel your pain is not well managed with the pain medications prescribed by your surgeon, please contact your surgeon's office to let them know so they can address your concerns.     **If you have questions or concerns about your  procedure,   call Dr. Weldon [phone]**      Discharge Instructions for Laparoscopic or Davinci Hysterectomy    Incisional Care  A small amount of drainage from your incisions is normal. You may wear Band Aids until the drainage stops. The day after surgery, if your incisions are dry, remove the Band Aids. Leave the Steri-Strips (small pieces of tape) in place. Let them fall off on their own, or gently remove them after 7 days.  Once your have removed your Band Aids, you may shower as usual. Wash your incisions with mild soap and water. Pat dry.  Don't use oils, powders, or lotions on your incisions.  General Care  Take your medications exactly as directed by your doctor.    You may have vaginal bleeding that can last for several weeks. Use pads only. Don't put anything in your vagina (tampons, douches or have sexual intercourse) until your doctor says it's safe to do so.  Avoid constipation.  Eat fruits, vegetables, and whole grains.  Drink 6-8 glasses of water a day, unless told to do otherwise.  Use a laxative or a mild stool softener if your doctor says it's okay.  Activity  Don't drive while you are still taking narcotic pain medications.  Don t do strenuous activities until the doctor says it's okay.  Walk as often as you feel able.    Pain  Your incisions may be tender or sore. You may also have pain in your upper back or shoulders. This is from the gas used to enlarge your abdomen to allow your doctor to see inside your pelvis and perform the procedure. This pain usually goes away in a day or two.   When to Call Your Doctor  Call your doctor right away if you have any of the following:  Fever above 100.4 F (38 C) or chills  Vaginal bleeding that soaks more than one sanitary pad per hour  A foul smelling discharge from the vagina  Difficulty urinating  Severe pain   Redness, swelling, or drainage at your incision sites  Follow-Up  Make a follow-up appointment as directed by your surgeon.      While you  were at the hospital today you were given 1000 mg of Tylenol. The recommended daily maximum dose is 4000 mg.     While you were at the hospital today you received Toradol, an antiinflammatory medication similar to Ibuprofen.  You should not take other antiinflammatory medication, such as Ibuprofen, Motrin, Advil, Aleve, Naprosyn, etc, until  7:20PM     **Because you had anesthesia today and your history of sleep apnea, it is extremely important that you use your CPAP machine for the next 24 hours while napping or sleeping.**

## 2017-02-23 NOTE — ANESTHESIA CARE TRANSFER NOTE
Patient: Rosalia Schulz    Procedure(s):  ROBOTIC ASSISTED TOTAL LAPAROSCOPIC HYSTERECTOMY, BILATERAL SALPINGOO-OOPHORECTOMY, LYMPHADENECTOMY, BILATERAL     - Wound Class: II-Clean Contaminated   - Wound Class: II-Clean Contaminated    Diagnosis: ENDOMETRIAL CANCER  Diagnosis Additional Information: No value filed.    Anesthesia Type:   General, RSI, ETT     Note:  Airway :Face Mask  Patient transferred to:PACU  Comments: Patient spont ventilating. Adequate TV's. Opening eyes to voice. ETT suctioned and removed without issues. To PACU with O2. Vitals stable. Report given to RN.       Vitals: (Last set prior to Anesthesia Care Transfer)    CRNA VITALS  2/23/2017 1138 - 2/23/2017 1217      2/23/2017             EKG: Sinus bradycardia                Electronically Signed By: CAYDEN Anne CRNA  February 23, 2017  12:17 PM

## 2017-02-23 NOTE — ANESTHESIA PREPROCEDURE EVALUATION
Anesthesia Evaluation     .        ROS/MED HX    ENT/Pulmonary:     (+)sleep apnea, uses CPAP , . .    Neurologic:     (+)migraines, CVA date: 2016 without deficits    Cardiovascular:     (+) Dyslipidemia, ----. : . . . :. .       METS/Exercise Tolerance:     Hematologic:         Musculoskeletal:         GI/Hepatic:     (+) GERD Asymptomatic on medication,       Renal/Genitourinary:         Endo:     (+) Obesity (BMI 35), .      Psychiatric:     (+) psychiatric history depression      Infectious Disease:         Malignancy:   (+) Malignancy History of Other  Other CA endometrial status post         Other:               Physical Exam  Normal systems: cardiovascular, pulmonary and dental    Airway   Mallampati: III  TM distance: >3 FB  Neck ROM: full    Dental     Cardiovascular       Pulmonary                     Anesthesia Plan      History & Physical Review  History and physical reviewed and following examination; no interval change.    ASA Status:  3 .    NPO Status:  > 8 hours    Plan for General, RSI and ETT with Propofol induction. Maintenance will be Balanced.    PONV prophylaxis:  Ondansetron (or other 5HT-3) and Dexamethasone or Solumedrol  precedex gtt      Postoperative Care  Postoperative pain management:  IV analgesics.      Consents  Anesthetic plan, risks, benefits and alternatives discussed with:  Patient..                          .

## 2017-02-23 NOTE — OP NOTE
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      PROCEDURE:  Robotic-assisted total laparoscopic hysterectomy, bilateral salpingo-oophorectomy, washings, bilateral pelvic lymphadenectomy.      SURGEON:  RAVEN Weldon MD      ASSISTANT:  Jessica RODARTE CNP      ANESTHESIA:  General endotracheal.      INDICATIONS FOR THE PROCEDURE:  Rosalia Schulz is a 51-year-old female with a longstanding history of oligo amenorrhea.  She presented recently with complaints of having irregular bleeding, especially in the last 6 months, but extremely with the last 2 menstrual periods.  Pelvic ultrasound revealed no fibroids but an 11 mm endometrial stripe.  She was evaluated by Dr. Prabhjot Dunne.  Hysteroscopy, endometrial curettage and NovaSure endometrial ablation were performed.  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  She had an intact mismatch repair enzymes.  The patient was seen in consultation in my office.  We elected to proceed with robotic hysterectomy and staging as deemed necessary based on frozen section evaluation.      FINDINGS:  The patient had no gross evidence of extrauterine disease.  She did have a retroflexed uterus.  Her uterus did contain a greater than 7 cm FIGO grade 1 endometrioid adenocarcinoma that was superficially myeloid invasive with a depth of invasion of the myometrium of approximately 2 mm based on frozen section evaluation.  We did perform a pelvic lymphadenectomy and there was no grossly positive lymph nodes.      PROCEDURE IN DETAIL:  The patient was taken to the operating room.  She received preoperative antibiotics.  Pneumatic compression stockings were placed on her lower extremities.  She is brought to the operating room, placed in the supine  position on a pink pad on the operating room table.  General endotracheal anesthesia was administered in the usual fashion.  Once intubated, she was repositioned in low lithotomy position using Yellofin stirrups.  Her arms were padded and held at her side with a draw sheet as well as a flat on the right hand side.  Her hands were equally protected.  Shoulder braces were applied to her shoulders and a Patton was placed above her face and forehead, a donut was placed over her face for facial protection.  She was prepped and draped in the usual sterile fashion.  A timeout was conducted and everyone agreed upon the procedure.  I started by infiltrating the supraumbilical area approximately 23 cm above the symphysis pubis in the midline with 0.5% Marcaine with epinephrine.  A small nick was made in the skin with #15 scalpel blade.  A Veress needle was introduced into the peritoneal cavity with opening pressures of 3 mmHg.  The abdomen was insufflated with carbon dioxide to create a diffuse pneumoperitoneum pressure limits are set maintained at or below 15 mmHg throughout the case.  With establishment of pneumoperitoneum the Veress needle was removed and replaced with 8 mm da Vladimir port.  The camera was then introduced confirming intraperitoneal position and also showed some filmy adhesions of the omentum to the periumbilical area.  Under direct visualization with no upper abdominal pathology noted, 4 additional port sites were placed, an 8 mm da Vladimir port was placed 8 cm to the right of the camera port in the upper abdomen.  Two 8 mm da Vladimir ports were placed 8 cm and 16 cm to the left of the camera port in the upper abdomen.  The patient was then placed in steep Trendelenburg and an 8 mm AirSeal port was placed above the right anterior superior iliac spine.  The robot was docked in the usual fashion.  Monopolar scissors were placed in the right upper robotic arm, a Maryland bipolar in the medial left upper robotic arm  and a ProGrasp in the lateral left robotic arm.  The instruments were advanced into the pelvis as far as the omental adhesions.  With the scissors,  the omental adhesions were taken down.  We then went to the pelvis.  We elevated the uterus.  Saline was instilled in the cul-de-sac and reaspirated as peritoneal cytology.  The right round ligament was elevated with a ProGrasp was cauterized with the Maryland bipolar and divided with the monopolar scissors.  We then used the monopolar scissors to divide the posterior broad ligament peritoneum lateral to the ovarian vessels.  The ovarian vessels were isolated by creating a defect in the peritoneum below them and above the ureter and extending this towards uterus.  They were cauterized at the pelvic brim with the Maryland bipolar and divided with the scissors.  We then did further retroperitoneal dissection, opening up the pararectal and paravesical spaces and isolating the uterine artery and it was clipped off with a Hem-O-Sarkis clip.  We then performed an identical procedure on the left hand side.  Once this was completed, the vesicouterine peritoneum was divided and the bladder was taken down easily off the lower uterine segment and upper vagina.  Starting in the left hand side at this point, the soft tissues at the isthmus were cauterized with the Maryland bipolar and divided with the scissors.  We proceeded down the cardinal ligament in a similar fashion, cauterizing and dividing the tissues, including the main trunk of the uterine artery and vein and the uterosacral ligament free of the cervix.  This was then repeated on the right hand side.  The EEA sizers pushed into the anterior fornix.  Anterior colpotomy was made at 12 o'clock and circumferentially the cervix was divided free of the vagina.  A single-tooth tenaculum was brought through the vagina was utilized to grasp the cervix.  Cervix, uterus, tubes and ovaries were removed intact.  There was a small amount  of cervix missing on the right hand the lateral side and this was removed from the upper vagina and sent off as a separate piece.  While awaiting pathology, we closed the vaginal cuff with 0 PDS suture on a CT2 needle using a BitGravity Vladimir needle  and the Maryland bipolar with the ProGrasp holding up the bladder flap.  We did a full thickness anterior to posterior closure starting at the angles bilaterally and moving towards the midline incorporating the uterosacrals and posterior cul-de-sac peritoneum in that closure.  The 2 sutures were tied together.  Pathology did call back stating that there was a fairly large tumor encompassing most of the endometrial cavity.  There was only 2 mm of myometrial invasion was felt to be a FIGO grade 1 endometrioid adenocarcinoma.  Based on this, I did perform a pelvic lymphadenectomy bilaterally.  The lymph-bearing fatty tissue from the circumflex iliac vein to the mid common iliac artery were dissected free of distal common iliac artery, external iliac artery and vein and into the obturator fossa.  In the obturator fossa lymph-bearing fatty tissue was freed up from the undersurface external iliac vein  medial aspect of the psoas and obturator nerves and vessels below.  Specimens, both right and left were placed in separate bags and removed sequentially.  Ray-Tecs were placed in the obturator fossa until we completed the procedure and then they were removed.  The pelvis was irrigated at that point, we closed the 12 mm fascial incision with a fascial closure device using 0 Vicryl suture.  The robotic instruments were removed.  The robot was undocked.  The pneumoperitoneum was allowed to dissipate and the trocars were removed intact.  The incisions were closed with 4-0 Monocryl in interrupted fashion to reapproximate the subcutaneous tissue and 4-0 Monocryl in a subcuticular fashion to reapproximate the skin.  Mastisol was placed around the incisions and Steri-Strips laid over the  incisions.  The patient's anesthesia was reversed.  She was extubated and taken to recovery room in stable condition.  Estimated blood loss 25 mL.      Jessica Sahni was my primary assist throughout the case, she was present for the entire case.  She helped with trocar placement.  She helped with docking the robot.  She helped with placement of the robotic instruments initially and extending them during the case.  She assisted through the accessory port site providing necessary countertraction when needed.  She was instrumental in specimen retrieval and helped with undocking the robot and port site closure.         PETER ALEXANDER MD             D: 2017 12:24   T: 2017 14:56   MT: [id number]      Name:     CASPER KOROMA   MRN:      [id number]        Account:        [id number]   :      1965           Procedure Date: 2017      Document: [id number]       cc: Prabhjot ADKINS

## 2017-02-24 LAB — COPATH REPORT: NORMAL

## 2017-02-27 LAB — COPATH REPORT: NORMAL

## 2017-03-06 ENCOUNTER — HOSPITAL ENCOUNTER (INPATIENT)
Facility: CLINIC | Age: 52
LOS: 5 days | Discharge: HOME | DRG: 862 | End: 2017-03-11
Attending: INTERNAL MEDICINE | Admitting: INTERNAL MEDICINE
Payer: COMMERCIAL

## 2017-03-06 ENCOUNTER — APPOINTMENT (OUTPATIENT)
Dept: CT IMAGING | Facility: CLINIC | Age: 52
DRG: 862 | End: 2017-03-06
Attending: NURSE PRACTITIONER
Payer: COMMERCIAL

## 2017-03-06 DIAGNOSIS — K65.9 ABDOMINAL INFECTION (H): Primary | ICD-10-CM

## 2017-03-06 LAB
ALBUMIN SERPL-MCNC: 2.6 G/DL (ref 3.4–5)
ALP SERPL-CCNC: 186 U/L (ref 40–150)
ALT SERPL W P-5'-P-CCNC: 177 U/L (ref 0–50)
ANION GAP SERPL CALCULATED.3IONS-SCNC: 7 MMOL/L (ref 3–14)
AST SERPL W P-5'-P-CCNC: 119 U/L (ref 0–45)
BILIRUB SERPL-MCNC: 0.6 MG/DL (ref 0.2–1.3)
BUN SERPL-MCNC: 13 MG/DL (ref 7–30)
CALCIUM SERPL-MCNC: 8.3 MG/DL (ref 8.5–10.1)
CHLORIDE SERPL-SCNC: 109 MMOL/L (ref 94–109)
CO2 SERPL-SCNC: 23 MMOL/L (ref 20–32)
CREAT SERPL-MCNC: 0.75 MG/DL (ref 0.52–1.04)
ERYTHROCYTE [DISTWIDTH] IN BLOOD BY AUTOMATED COUNT: 22.1 % (ref 10–15)
GFR SERPL CREATININE-BSD FRML MDRD: 81 ML/MIN/1.7M2
GLUCOSE BLDC GLUCOMTR-MCNC: 111 MG/DL (ref 70–99)
GLUCOSE BLDC GLUCOMTR-MCNC: 120 MG/DL (ref 70–99)
GLUCOSE SERPL-MCNC: 123 MG/DL (ref 70–99)
HCT VFR BLD AUTO: 32 % (ref 35–47)
HGB BLD-MCNC: 10 G/DL (ref 11.7–15.7)
INR PPP: 1.2 (ref 0.86–1.14)
MCH RBC QN AUTO: 23.7 PG (ref 26.5–33)
MCHC RBC AUTO-ENTMCNC: 31.3 G/DL (ref 31.5–36.5)
MCV RBC AUTO: 76 FL (ref 78–100)
MRSA DNA SPEC QL NAA+PROBE: NORMAL
PLATELET # BLD AUTO: 161 10E9/L (ref 150–450)
POTASSIUM SERPL-SCNC: 3.6 MMOL/L (ref 3.4–5.3)
PROT SERPL-MCNC: 6.2 G/DL (ref 6.8–8.8)
RBC # BLD AUTO: 4.22 10E12/L (ref 3.8–5.2)
SODIUM SERPL-SCNC: 139 MMOL/L (ref 133–144)
SPECIMEN SOURCE: NORMAL
WBC # BLD AUTO: 10.3 10E9/L (ref 4–11)

## 2017-03-06 PROCEDURE — 25000128 H RX IP 250 OP 636: Performed by: RADIOLOGY

## 2017-03-06 PROCEDURE — 25000128 H RX IP 250 OP 636: Performed by: INTERNAL MEDICINE

## 2017-03-06 PROCEDURE — 25000125 ZZHC RX 250: Performed by: INTERNAL MEDICINE

## 2017-03-06 PROCEDURE — 85610 PROTHROMBIN TIME: CPT | Performed by: NURSE PRACTITIONER

## 2017-03-06 PROCEDURE — 99207 ZZC APP CREDIT; MD BILLING SHARED VISIT: CPT | Performed by: INTERNAL MEDICINE

## 2017-03-06 PROCEDURE — 36415 COLL VENOUS BLD VENIPUNCTURE: CPT | Performed by: NURSE PRACTITIONER

## 2017-03-06 PROCEDURE — 99223 1ST HOSP IP/OBS HIGH 75: CPT | Mod: AI | Performed by: INTERNAL MEDICINE

## 2017-03-06 PROCEDURE — 72192 CT PELVIS W/O DYE: CPT

## 2017-03-06 PROCEDURE — 87640 STAPH A DNA AMP PROBE: CPT | Performed by: INTERNAL MEDICINE

## 2017-03-06 PROCEDURE — 80053 COMPREHEN METABOLIC PANEL: CPT | Performed by: INTERNAL MEDICINE

## 2017-03-06 PROCEDURE — 85027 COMPLETE CBC AUTOMATED: CPT | Performed by: INTERNAL MEDICINE

## 2017-03-06 PROCEDURE — 87641 MR-STAPH DNA AMP PROBE: CPT | Performed by: INTERNAL MEDICINE

## 2017-03-06 PROCEDURE — 36415 COLL VENOUS BLD VENIPUNCTURE: CPT | Performed by: INTERNAL MEDICINE

## 2017-03-06 PROCEDURE — 25000132 ZZH RX MED GY IP 250 OP 250 PS 637: Performed by: INTERNAL MEDICINE

## 2017-03-06 PROCEDURE — 12000000 ZZH R&B MED SURG/OB

## 2017-03-06 PROCEDURE — 25800025 ZZH RX 258: Performed by: INTERNAL MEDICINE

## 2017-03-06 PROCEDURE — S5010 5% DEXTROSE AND 0.45% SALINE: HCPCS | Performed by: INTERNAL MEDICINE

## 2017-03-06 PROCEDURE — 00000146 ZZHCL STATISTIC GLUCOSE BY METER IP

## 2017-03-06 RX ORDER — DEXTROSE MONOHYDRATE 50 MG/ML
INJECTION, SOLUTION INTRAVENOUS CONTINUOUS
Status: DISCONTINUED | OUTPATIENT
Start: 2017-03-06 | End: 2017-03-06

## 2017-03-06 RX ORDER — ACETAMINOPHEN 325 MG/1
650 TABLET ORAL EVERY 4 HOURS PRN
Status: DISCONTINUED | OUTPATIENT
Start: 2017-03-06 | End: 2017-03-11 | Stop reason: HOSPADM

## 2017-03-06 RX ORDER — POLYETHYLENE GLYCOL 3350 17 G/17G
17 POWDER, FOR SOLUTION ORAL DAILY PRN
Status: DISCONTINUED | OUTPATIENT
Start: 2017-03-06 | End: 2017-03-11 | Stop reason: HOSPADM

## 2017-03-06 RX ORDER — LORAZEPAM 2 MG/ML
0.5 INJECTION INTRAMUSCULAR ONCE
Status: DISCONTINUED | OUTPATIENT
Start: 2017-03-06 | End: 2017-03-11 | Stop reason: HOSPADM

## 2017-03-06 RX ORDER — LORAZEPAM 2 MG/ML
.5-1 INJECTION INTRAMUSCULAR EVERY 4 HOURS PRN
Status: DISCONTINUED | OUTPATIENT
Start: 2017-03-06 | End: 2017-03-11 | Stop reason: HOSPADM

## 2017-03-06 RX ORDER — AMOXICILLIN 250 MG
1-2 CAPSULE ORAL 2 TIMES DAILY PRN
Status: DISCONTINUED | OUTPATIENT
Start: 2017-03-06 | End: 2017-03-11 | Stop reason: HOSPADM

## 2017-03-06 RX ORDER — NALOXONE HYDROCHLORIDE 0.4 MG/ML
.1-.4 INJECTION, SOLUTION INTRAMUSCULAR; INTRAVENOUS; SUBCUTANEOUS
Status: DISCONTINUED | OUTPATIENT
Start: 2017-03-06 | End: 2017-03-11 | Stop reason: HOSPADM

## 2017-03-06 RX ORDER — ONDANSETRON 2 MG/ML
4 INJECTION INTRAMUSCULAR; INTRAVENOUS EVERY 6 HOURS PRN
Status: DISCONTINUED | OUTPATIENT
Start: 2017-03-06 | End: 2017-03-11 | Stop reason: HOSPADM

## 2017-03-06 RX ORDER — LIDOCAINE 40 MG/G
CREAM TOPICAL
Status: CANCELLED | OUTPATIENT
Start: 2017-03-06

## 2017-03-06 RX ORDER — ONDANSETRON 4 MG/1
4 TABLET, ORALLY DISINTEGRATING ORAL EVERY 6 HOURS PRN
Status: DISCONTINUED | OUTPATIENT
Start: 2017-03-06 | End: 2017-03-11 | Stop reason: HOSPADM

## 2017-03-06 RX ORDER — HYDROMORPHONE HYDROCHLORIDE 1 MG/ML
.3-.5 INJECTION, SOLUTION INTRAMUSCULAR; INTRAVENOUS; SUBCUTANEOUS
Status: DISCONTINUED | OUTPATIENT
Start: 2017-03-06 | End: 2017-03-11 | Stop reason: HOSPADM

## 2017-03-06 RX ORDER — PIPERACILLIN SODIUM, TAZOBACTAM SODIUM 3; .375 G/15ML; G/15ML
3.38 INJECTION, POWDER, LYOPHILIZED, FOR SOLUTION INTRAVENOUS EVERY 6 HOURS
Status: DISCONTINUED | OUTPATIENT
Start: 2017-03-06 | End: 2017-03-09

## 2017-03-06 RX ORDER — SUMATRIPTAN 50 MG/1
50 TABLET, FILM COATED ORAL EVERY 8 HOURS PRN
Status: DISCONTINUED | OUTPATIENT
Start: 2017-03-06 | End: 2017-03-11 | Stop reason: HOSPADM

## 2017-03-06 RX ADMIN — FLUOXETINE HYDROCHLORIDE 40 MG: 20 CAPSULE ORAL at 09:35

## 2017-03-06 RX ADMIN — MIDAZOLAM HYDROCHLORIDE 2 MG: 1 INJECTION, SOLUTION INTRAMUSCULAR; INTRAVENOUS at 15:10

## 2017-03-06 RX ADMIN — OMEPRAZOLE 20 MG: 20 CAPSULE, DELAYED RELEASE ORAL at 09:35

## 2017-03-06 RX ADMIN — SENNOSIDES AND DOCUSATE SODIUM 2 TABLET: 8.6; 5 TABLET ORAL at 18:26

## 2017-03-06 RX ADMIN — PIPERACILLIN SODIUM,TAZOBACTAM SODIUM 3.38 G: 3; .375 INJECTION, POWDER, FOR SOLUTION INTRAVENOUS at 11:43

## 2017-03-06 RX ADMIN — SENNOSIDES AND DOCUSATE SODIUM 1 TABLET: 8.6; 5 TABLET ORAL at 09:41

## 2017-03-06 RX ADMIN — PIPERACILLIN SODIUM,TAZOBACTAM SODIUM 3.38 G: 3; .375 INJECTION, POWDER, FOR SOLUTION INTRAVENOUS at 18:14

## 2017-03-06 RX ADMIN — ACETAMINOPHEN 650 MG: 325 TABLET, FILM COATED ORAL at 09:35

## 2017-03-06 RX ADMIN — ACETAMINOPHEN 650 MG: 325 TABLET, FILM COATED ORAL at 16:21

## 2017-03-06 RX ADMIN — DEXTROSE AND SODIUM CHLORIDE: 5; 450 INJECTION, SOLUTION INTRAVENOUS at 09:41

## 2017-03-06 RX ADMIN — PIPERACILLIN SODIUM,TAZOBACTAM SODIUM 3.38 G: 3; .375 INJECTION, POWDER, FOR SOLUTION INTRAVENOUS at 06:12

## 2017-03-06 RX ADMIN — HYDROMORPHONE HYDROCHLORIDE 0.5 MG: 1 INJECTION, SOLUTION INTRAMUSCULAR; INTRAVENOUS; SUBCUTANEOUS at 18:27

## 2017-03-06 RX ADMIN — HYDROMORPHONE HYDROCHLORIDE 0.5 MG: 1 INJECTION, SOLUTION INTRAMUSCULAR; INTRAVENOUS; SUBCUTANEOUS at 07:43

## 2017-03-06 RX ADMIN — DEXTROSE AND SODIUM CHLORIDE: 5; 450 INJECTION, SOLUTION INTRAVENOUS at 20:38

## 2017-03-06 RX ADMIN — DEXTROSE MONOHYDRATE: 50 INJECTION, SOLUTION INTRAVENOUS at 03:53

## 2017-03-06 RX ADMIN — HYDROMORPHONE HYDROCHLORIDE 0.5 MG: 1 INJECTION, SOLUTION INTRAMUSCULAR; INTRAVENOUS; SUBCUTANEOUS at 20:34

## 2017-03-06 RX ADMIN — ACETAMINOPHEN 650 MG: 325 TABLET, FILM COATED ORAL at 20:34

## 2017-03-06 RX ADMIN — HYDROMORPHONE HYDROCHLORIDE 0.5 MG: 1 INJECTION, SOLUTION INTRAMUSCULAR; INTRAVENOUS; SUBCUTANEOUS at 11:26

## 2017-03-06 RX ADMIN — HYDROMORPHONE HYDROCHLORIDE 0.5 MG: 1 INJECTION, SOLUTION INTRAMUSCULAR; INTRAVENOUS; SUBCUTANEOUS at 03:54

## 2017-03-06 ASSESSMENT — ACTIVITIES OF DAILY LIVING (ADL)
BATHING: 0-->INDEPENDENT
RETIRED_COMMUNICATION: 0-->UNDERSTANDS/COMMUNICATES WITHOUT DIFFICULTY
RETIRED_EATING: 0-->INDEPENDENT
COGNITION: 0 - NO COGNITION ISSUES REPORTED
TOILETING: 0-->INDEPENDENT
SWALLOWING: 0-->SWALLOWS FOODS/LIQUIDS WITHOUT DIFFICULTY
DRESS: 0-->INDEPENDENT
TRANSFERRING: 0-->INDEPENDENT
FALL_HISTORY_WITHIN_LAST_SIX_MONTHS: NO
AMBULATION: 0-->INDEPENDENT

## 2017-03-06 ASSESSMENT — PAIN DESCRIPTION - DESCRIPTORS
DESCRIPTORS: ACHING;CONSTANT
DESCRIPTORS: ACHING

## 2017-03-06 NOTE — PLAN OF CARE
Problem: Goal Outcome Summary  Goal: Goal Outcome Summary  Outcome: No Change  Transferred in from [facility] around 0215 this morning per EMS. Stable upon arrival. Post-operative pain tolerated with Dilaudid. Plan for Gynecology consult today. Will continue to monitor.

## 2017-03-06 NOTE — CONSULTS
GYN/ONC CONSULT  Patient Name: Rosalia Schulz  Address:  [facility] [address]  Age:51 year old, : 1965   Sex: female   Admission Date/Time: 3/6/2017  2:23 AM   CC: I was asked to see Ms. Rosalia Schulz by Dr. Luna in consultation for fevers and abdominal pain.    HPI: Patient is a 51-year-old woman with endometrial cancer who underwent a Robotic-assisted total laparoscopic hysterectomy, bilateral salpingo-oophorectomy, washings, bilateral pelvic lymphadenectomy on 17. She notes yesterday afternoon developing a fever with generalized abdominal pain. Her last BM was Saturday. She is urinating without difficulty. Her appetite has been fine and she denies any nausea or vomiting. She initially presented to [facility] yesterday where she was found to have a temp of 103.5 and WBC of 11 per patient. Per ER notes, CT scan demonstrates a fluid collection with no definitive abscess and a question of possible developing abscess.    Her history is as follows:   Long h/o oligoamenorrhea   Presented recently with c/o heavy and irregular bleeding especially in last 6 months but extreme with last two menstrual periods.    PUS: No fibroids. 11 mm endometrial stripe   16 Evaluated by Dr. 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 Intact mismatch repair enzymes.   17: Robotic-assisted total laparoscopic hysterectomy, bilateral salpingo-oophorectomy, washings,bilateral pelvic lymphadenectomy. Final pathology showed stage I A grade 1 endometrioid adenocarcinoma. There was 0.9 mm out of 1.8 cm myometrial invasion, no cervical involvement and no lymph node involvement. Washings were negative.    REVIEW OF SYSTEMS: 14 point ROS obtained and negative aside from that mentioned in the above HPI.   Noted an epidsode of possible incontinence after the CT in [place]  [facility].  C/o abdominal distension and diffuse tenderness.    PAST MEDICAL HISTORY  Past Medical History   Diagnosis Date     Anemia      CVA (cerebral vascular accident) (H) left thalmus 2016     Gastroesophageal reflux disease      Major depression in partial remission (H)      Migraine      Obesity      KARTHIK (obstructive sleep apnea)       PAST SURGICAL HISTORY  Past Surgical History   Procedure Laterality Date     Abdomen surgery        section     Orthopedic surgery       ganglion cyst     Cyst removal scalp       Gyn surgery       ablation     Davinci hysterectomy total, bilateral salpingo-oophorectmy, node dissection, tumor staging, combined Bilateral 2017     Procedure: COMBINED DAVINCI XI HYSTERECTOMY TOTAL, SALPINGO-OOPHORECTOMY, NODE DISSECTION, TUMOR STAGING;  Surgeon: Tiffanie Weldon MD;  Location:  OR     Davinci lymphadenectomy Bilateral 2017     Procedure: DAVINCI LYMPHADENECTOMY;  Surgeon: Tiffanie Weldon MD;  Location:  OR       CURRENT MEDS  Current Facility-Administered Medications   Medication     FLUoxetine (PROzac) capsule 40 mg     omeprazole (priLOSEC) CR capsule 20 mg     SUMAtriptan (IMITREX) tablet 50 mg     naloxone (NARCAN) injection 0.1-0.4 mg     dextrose 5% infusion     acetaminophen (TYLENOL) tablet 650 mg    Or     acetaminophen (TYLENOL) solution 650 mg     HYDROmorphone (PF) (DILAUDID) injection 0.3-0.5 mg     ondansetron (ZOFRAN-ODT) ODT tab 4 mg    Or     ondansetron (ZOFRAN) injection 4 mg     polyethylene glycol (MIRALAX/GLYCOLAX) Packet 17 g     senna-docusate (SENOKOT-S;PERICOLACE) 8.6-50 MG per tablet 1-2 tablet     piperacillin-tazobactam (ZOSYN) 3.375 g vial to attach to  mL bag     vancomycin (VANCOCIN) 1500 mg in 0.9% NaCl 250 mL PREMIX     ALLERGIES/SENSITIVITIES  No Known Allergies  FAMILY HISTORY  No family history on file.  SOCIAL HISTORY  Social History     Social History     Marital status:      Spouse name: N/A      "Number of children: N/A     Years of education: N/A     Occupational History     Not on file.     Social History Main Topics     Smoking status: Never Smoker     Smokeless tobacco: Not on file     Alcohol use Yes      Comment: 1- 2per week     Drug use: No     Sexual activity: Not on file     Other Topics Concern     Not on file     Social History Narrative      PHYSICAL EXAM  /67  Temp 98.7  F (37.1  C) (Oral)  Resp 12  Ht 1.676 m (5' 6\")  Wt 100.3 kg (221 lb 1.9 oz)  SpO2 91%  BMI 35.69 kg/m2  Body mass index is 35.69 kg/(m^2).      GENERAL: appears healthy, NAD, tired    LUNGS:  No dyspnea  ABDOMEN: Non distended, soft, tender throughout, 5 laparoscopic port incisions are well healing with no evidence of infection.  Some scabbing RLQ incision  EXTREMITIES:  No edema, no deformities  SKIN: warm and dry, no jaundice, no rashes  NEURO:  Cranial nerves II-XII grossly intact, alert and oriented, motor exam intact   PSYCH: appropriate mood and affect    LABS  Recent Labs   Lab Test  03/06/17   0455  02/23/17   0826   WBC  10.3   --    RBC  4.22   --    HGB  10.0*  12.0   HCT  32.0*   --    MCV  76*   --    MCH  23.7*   --    MCHC  31.3*   --    PLT  161   --      Recent Labs   Lab Test  03/06/17   0455   POTASSIUM  3.6   CHLORIDE  109   BUN  13       Recent Labs   Lab Test  03/06/17   0455   BILITOTAL  0.6   AST  119*   ALT  177*     No results found for: INR      IMAGING  CT scan not viewable yet.       ASSESSMENT AND PLAN:   51 y.o. status post surgery as above, with stage IA grade 1 endometrioid adenocarcinoma of the endometrium. She presents with high fevers and leukocytosis. CT demonstrates possible suspicion for abscess vs. Hematoma. Discussed with IR regarding drainage. IR to drain and send fluid for cultures. Continue antibiotics. Afebrile since arrival. WBC improved. Discussed with patient. Will order Ativan prior to procedure.   Jessica Sahni CNP  GYN ONC  Cell: [phone]    Patient seen and examined.  " Abdomen diffusely tender to palpation and has mild diffuse distension and tympany as well as hypoactive BS.  Was seen in IR and repeat imaging showed no residual fluid.  I suspect she leaked fluid through vaginal stitches when she had the episode of incontinence last night.  I suspect she has an abdominal infection due to bacteria migrating from vagina into lymphatic fluid.  She has evidence of an ileus.  Continue current antibiotics and f/u cultures from [place].  She will likely need broad spectrum oral coverage once her sx improve and she can be transitioned to PO antibiotics to complete 14 days course.  Her anxiety is a major issue and she is written for Ativan.  If another IV needed, I would place PICC line since she has poor peripheral access and is not tolerant to multiple sticks for IV's.  I have no idea why she has a mild transaminiitis and this will need to be followed.    RAVEN Weldon M.D.  [phone]

## 2017-03-06 NOTE — PLAN OF CARE
Problem: Individualization  Goal: Patient Preferences  Outcome: No Change  Neuro: Pt is alert and oriented, gets very anxious with procedures/ needle sticks. PERRL, follows commands. MAEE.  CV- SR, BP stable.  Respiratory- LS diminished, pt tolerating RA. 2 L NC applied with anxiety.   Gi/- NPO for CT/ IR procedure. Up with assist x1 to BR, voiding well. 5 healing puncture sites noted to abdomen, CDI. R site with erythema. Pt complains of discomfort with palpation to abdomen, relief obtained with ativan.  CMS/SKIN- Intact.   MISC-  and friend present bedside and updated on POC. Pt to transfer to station [id number] this afternoon, oncoming nurse to be updated.

## 2017-03-06 NOTE — PROGRESS NOTES
Patient monitored in CT by flying squad RN.  2mg Versed given pre-procedure imaging.  Vitals stable throughout.  Transported back to ICU in stable condition with family at bedside.

## 2017-03-06 NOTE — PHARMACY-ADMISSION MEDICATION HISTORY
Admission medication history interview status for the 3/6/2017  admission is complete. See EPIC admission navigator for prior to admission medications     Medication history source reliability:Good    Actions taken by pharmacist (provider contacted, etc): interviewed patient, epic notes     Additional medication history information not noted on PTA med list :None    Medication reconciliation/reorder completed by provider prior to medication history? No    Time spent in this activity: 20 min    Prior to Admission medications    Medication Sig Last Dose Taking? Auth Provider   multivitamin, therapeutic with minerals (MULTI-VITAMIN) TABS tablet Take 1 tablet by mouth daily unknown Yes Reported, Patient   ibuprofen (ADVIL/MOTRIN) 600 MG tablet Take 1 tablet (600 mg) by mouth every 6 hours as needed for pain (mild) unknown Yes Jessica Sahni APRN CNP   FLUoxetine HCl (PROZAC PO) Take 40 mg by mouth daily unknown Yes Reported, Patient   SUMAtriptan Succinate (IMITREX PO) Take 50 mg by mouth May repeat after two hours.  Maximum dose 200 mg/24 hours unknown Yes Reported, Patient   ASPIRIN PO Take 81 mg by mouth daily unknown Yes Reported, Patient   OMEPRAZOLE PO Take 20 mg by mouth every morning unknown Yes Reported, Patient   ATORVASTATIN CALCIUM PO Take 10 mg by mouth daily unknown Yes Reported, Patient

## 2017-03-06 NOTE — IP AVS SNAPSHOT
[address], [address]    [facility] [address]    Phone:  [phone]                                       After Visit Summary   3/6/2017    Rosalia Schulz    MRN: [id number]           After Visit Summary Signature Page     I have received my discharge instructions, and my questions have been answered. I have discussed any challenges I see with this plan with the nurse or doctor.    ..........................................................................................................................................  Patient/Patient Representative Signature      ..........................................................................................................................................  Patient Representative Print Name and Relationship to Patient    ..................................................               ................................................  Date                                            Time    ..........................................................................................................................................  Reviewed by Signature/Title    ...................................................              ..............................................  Date                                                            Time

## 2017-03-06 NOTE — CONSULTS
[facility]    Infectious Disease Consultation     Date of Admission:  3/6/2017  Date of Consult (When I saw the patient): 03/06/17    Assessment & Plan   Rosalia Schulz is a 51 year old female who was admitted on 3/6/2017. I was asked to see the patient for intraabdominal infection.    Impression:   51 y.o with h/o endometrial adenocarcinoma with total abdominal hysterectomy and bilateral salpingo-oophorectomy with lymphadenectomy done on 2/23.   Admitted this occasion with fever, abdominal pain, leucocytosis and CT abdomen with fluid collection concerning for abscess.    Seen by Gyn plan for Drainage by IR.   On zosyn.     Recommendations:   Continue on zosyn alone.   Will follow up on IR plans, cultures when fluid obtained.       Ivonne Mckay MD    Reason for Consult   Reason for consult: I was asked by Dr. Hancock to evaluate this patient for above mentioned.    Primary Care Physician   Joby Hammonds    Chief Complaint   Abdominal pain     History is obtained from the patient and medical records    History of Present Illness   Rosalia Schulz is a 51 year old female with history of CVA in 05/2016, iron deficiency anemia, depression, gastroesophageal reflux disease, migraines, obstructive sleep apnea and recent surgery for endometrial adenocarcinoma with total abdominal hysterectomy and bilateral salpingo-oophorectomy with lymphadenectomy who presented after having fever and abdominal pain. No other complaints to implicate other organ system.     Past Medical History   I have reviewed this patient's medical history and updated it with pertinent information if needed.   Past Medical History   Diagnosis Date     Anemia      CVA (cerebral vascular accident) (H) left thalmus 05/2016     Gastroesophageal reflux disease      Major depression in partial remission (H)      Migraine      Obesity      KARTHIK (obstructive sleep apnea)        Past Surgical History   I have reviewed this patient's surgical  history and updated it with pertinent information if needed.  Past Surgical History   Procedure Laterality Date     Abdomen surgery        section     Orthopedic surgery       ganglion cyst     Cyst removal scalp       Gyn surgery       ablation     Davinci hysterectomy total, bilateral salpingo-oophorectmy, node dissection, tumor staging, combined Bilateral 2017     Procedure: COMBINED DAVINCI XI HYSTERECTOMY TOTAL, SALPINGO-OOPHORECTOMY, NODE DISSECTION, TUMOR STAGING;  Surgeon: Tiffanie Weldon MD;  Location: [facility] OR     Davinci lymphadenectomy Bilateral 2017     Procedure: DAVINCI LYMPHADENECTOMY;  Surgeon: Tiffanie Weldon MD;  Location:  OR       Prior to Admission Medications   Prior to Admission Medications   Prescriptions Last Dose Informant Patient Reported? Taking?   ASPIRIN PO unknown Self Yes Yes   Sig: Take 81 mg by mouth daily   ATORVASTATIN CALCIUM PO unknown Self Yes Yes   Sig: Take 10 mg by mouth daily   FLUoxetine HCl (PROZAC PO) unknown Self Yes Yes   Sig: Take 40 mg by mouth daily   OMEPRAZOLE PO unknown Self Yes Yes   Sig: Take 20 mg by mouth every morning   SUMAtriptan Succinate (IMITREX PO) unknown Self Yes Yes   Sig: Take 50 mg by mouth May repeat after two hours.  Maximum dose 200 mg/24 hours   ibuprofen (ADVIL/MOTRIN) 600 MG tablet unknown Self No Yes   Sig: Take 1 tablet (600 mg) by mouth every 6 hours as needed for pain (mild)   multivitamin, therapeutic with minerals (MULTI-VITAMIN) TABS tablet unknown Self Yes Yes   Sig: Take 1 tablet by mouth daily      Facility-Administered Medications: None     Allergies   No Known Allergies    Immunization History     There is no immunization history on file for this patient.    Social History   I have reviewed this patient's social history and updated it with pertinent information if needed. Rosalia RICHARD Schulz  reports that she has never smoked. She does not have any smokeless tobacco history on file. She reports that she  drinks alcohol. She reports that she does not use illicit drugs.    Family History   I have reviewed this patient's family history and updated it with pertinent information if needed.   No family history on file.    Review of Systems   The 10 point Review of Systems is negative other than noted in the HPI or here.     Physical Exam   Temp: 98.4  F (36.9  C) Temp src: Axillary BP: 98/58   Heart Rate: 65 Resp: 19 SpO2: 92 % O2 Device: None (Room air)    Vital Signs with Ranges  Temp:  [98.4  F (36.9  C)-98.9  F (37.2  C)] 98.4  F (36.9  C)  Heart Rate:  [63-76] 65  Resp:  [10-19] 19  BP: ()/(56-67) 98/58  SpO2:  [85 %-99 %] 92 %  221 lbs 1.94 oz    GENERAL APPEARANCE: alert, awake, in pain because of the abdominal soreness   EYES: Eyes grossly normal to inspection, PERRL and conjunctivae and sclerae normal  HENT: ear canals and TM's normal and nose and mouth without ulcers or lesions  NECK: no adenopathy, no asymmetry, masses, or scars and thyroid normal to palpation  RESP: lungs clear to auscultation - no rales, rhonchi or wheezes  CV: regular rates and rhythm, normal S1 S2, no S3 or S4 and no murmur, click or rub  LYMPHATICS: normal ant/post cervical and supraclavicular nodes  ABDOMEN: soft, the scars from recent surgery ( laparoscopic) all appear to be healing, no drainage slight redness on the one in the right lower quadrant,   Abdomen is tender to palpation all over but most in the right lower quadrant   MS: extremities normal- no gross deformities noted  SKIN: no suspicious lesions or rashes  NEURO: Normal strength and tone, mentation intact and speech normal  PSYCH: mentation appears normal and affect normal/bright    Data   Lab Results   Component Value Date    WBC 10.3 03/06/2017    HGB 10.0 (L) 03/06/2017    HCT 32.0 (L) 03/06/2017     03/06/2017     03/06/2017    POTASSIUM 3.6 03/06/2017    CHLORIDE 109 03/06/2017    CO2 23 03/06/2017    BUN 13 03/06/2017    CR 0.75 03/06/2017      (H) 03/06/2017     (H) 03/06/2017     (H) 03/06/2017    ALKPHOS 186 (H) 03/06/2017    BILITOTAL 0.6 03/06/2017    INR 1.20 (H) 03/06/2017     No results for input(s): CULT in the last 168 hours.  No lab results found.    Invalid input(s): UC

## 2017-03-06 NOTE — PROGRESS NOTES
1235 Pt's chart reviewed for drain placement. Pt takes Aspirin 81mg daily and did take this yesterday per pt's RN. Dr. Haywood pgd re this.  1306 OK to proceed per Dr. Haywood.

## 2017-03-06 NOTE — H&P
"DATE OF ADMISSION:  03/06/2017      PRIMARY CARE PHYSICIAN:  None given.      CHIEF COMPLAINT:  Abdominal pain and fever.      HISTORY OF PRESENT ILLNESS:  Ms. Rosalia Schulz is a delightfully pleasant 51-year-old female with a medical history remarkable for cerebrovascular accident in 05/2016, iron deficiency anemia, depression, gastroesophageal reflux disease, migraines, obstructive sleep apnea as well as recent surgery for endometrial adenocarcinoma with total abdominal hysterectomy and bilateral salpingo-oophorectomy with lymphadenectomy who presents today after having postop fever and abdominal pain.  She had her procedure performed here at [facility] on the 23rd by Dr. Weldon and did fine postoperatively.  She states she was doing great until this Friday, 03/03, when she states she \"overdid it.\"  She had some shakes/rigors on Friday night as well as feeling achy.  Saturday she had similar symptoms, not feeling well as well as sore muscles in her belly.  Sunday she states she was \"a little off\" but was extremely tired.  She had onset of marked shaking chills and could not get warm.  She checked her temperature; initially it was 101 and on repeat 50 minutes later it was 103.  She called the on-call doctor for gynecology and she was advised presentation to the Emergency Department.  At the time of presentation to the Emergency Department she had a temperature as high as 103.5.  She had diffuse abdominal pain.  Labs showed a white count of 11.9, which is slightly elevated, but she did have a left shift with 91% neutrophils.  A CT of her belly was performed that showed a fluid collection with no definitive abscess and a question of developing abscess.      At the time of my visit with Ms. Schulz she is doing okay.  Her biggest complaint at this time is markedly fatigue.  She currently denies any fevers, chills or rigors.  She denies any chest pain or shortness of breath.  She does state that she still has abdominal " pain.  She denies any nausea, vomiting, constipation or diarrhea.      PAST MEDICAL HISTORY:   1.  History of cerebrovascular accident in 05/2016 involving the left thalamus.  She states her symptoms have resolved.   2.  Iron deficiency anemia.   3.  History of depression.   4.  Gastroesophageal reflux disease.   5.  History of migraines approximately 1 per month.   6.  Obstructive sleep apnea, using CPAP at night.   7.  Endometrioid adenocarcinoma with total abdominal hysterectomy and bilateral salpingo-oophorectomy with lymphadenectomy performed 02/23 by Dr. Weldon in 2017.      MEDICATIONS:   1.  Aspirin 81 mg daily.   2.  Colace p.r.n.   3.  Fluoxetine 40 mg daily.   4.  Ibuprofen p.r.n.   5.  Multivitamin.   6.  Omeprazole.   7.  Percocet as needed.   8.  Imitrex as needed.   9.  Lipitor 10 mg daily.      SOCIAL HISTORY:  The patient denies alcohol or tobacco.  She is .      FAMILY HISTORY:  Negative per patient.      ALLERGIES:  None known.      REVIEW OF SYSTEMS:  Full 10-point review of systems was performed and other than the issues discussed above was assessed as negative.      PHYSICAL EXAMINATION:   VITAL SIGNS:  Blood pressure is 101/56, respirations 13, heart rates in the 70s range and she is afebrile here.  She did have a T-max at the outside care center of 102.5.  O2 sats are 97% on room air.   GENERAL:  She is alert, oriented, pleasant, quite somnolent.   HEENT:  Extraocular movements intact.  Pupils equal, round, reactive.  Sclerae are anicteric.  Oropharynx was with dry mucous membranes.   NECK:  Supple.  She had no lymphadenopathy.   CARDIAC:  Revealed a regular rate and rhythm without murmurs or gallops.   LUNGS:  Clear to auscultation throughout without wheezes or crackles.   ABDOMEN:  Diffusely tender on examination with focal point tenderness in the right and left lower quadrants.  There is minimal periumbilical tenderness.   SKIN:  Showed some facial flushing, otherwise there are no  rashes or lesions.   NEUROLOGIC:  Cranial nerves were intact, moved extremities and strength and sensorium appeared grossly intact.   MUSCULOSKELETAL:  No cyanosis, clubbing or deformities noted.   PSYCHIATRIC:  Mood and affect within normal limits.      LABORATORY DATA:  Labs checked at the outside institution showed a white count of 11.9 with 91% neutrophils, hemoglobin 10.9 and platelets 215.  Basic metabolic panel was within normal limits.  Liver function tests were remarkable for an AST was 176,  and alkaline phosphatase 187.  Her lactate was 1.5.  Urinalysis was checked and was bland.  Her influenza was negative.      IMAGING:  CT of her abdomen was done at the outside institution.  I do not have the official read but I have the comments from the providers at [facility] where she came from that mention moderate fluid in her postsurgical area without definitive abscess but question of developing abscess.      ASSESSMENT AND PLAN:   1.  Fever with mild leukocytosis and abdominal pain.  It appears that Ms. Schulz has an infection, likely postoperative, of unclear etiology.  She is approximately 1-1/2 weeks out from her surgery.  She had a fever to 103.5.  She received vancomycin and Zosyn at the outside hospital and has had improvement in her fever since then.  Cultures were drawn and are pending from the outside hospital.  Her vitals are otherwise stable here on arrival at [facility].  At this time I will continue her vancomycin and Zosyn.  Will hydrate with normal saline and keep her n.p.o. in case they want to do any procedures.  I will consult Gynecology to see the patient and come up with plans.  Will keep her n.p.o. until definitive plans are made.  Will use p.o. acetaminophen and IV Dilaudid for pain management.  Will continue to monitor labs closely.   2.  Mildly elevated liver function tests.  I am not quite sure what to make of this.  We will simply recheck and monitor.   3.  History of  cerebrovascular accident.  There were no issues with this.  We will hold her aspirin in case surgery is needed.   4.  History of iron deficiency anemia.  Hemoglobin was 10.9.  On reevaluation her hemoglobin was 10.2 in 2016, it was 12.8 in 2016.  We will simply monitor.   5.  Depression.  Continue her fluoxetine.   6.  Gastroesophageal reflux disease.  Will continue her omeprazole.   7.  Migraines.  Will have p.r.n. Imitrex available.   8.  Obstructive sleep apnea.  She states she does not necessarily want to wear her mask tonight but if she is here for a couple of days will more than likely need to put her on her outpatient CPAP.      DEEP VENOUS THROMBOSIS PROPHYLAXIS:  Mechanical.      CODE STATUS:  Full code.         LINDEN BURNS MD             D: 2017 03:46   T: 2017 04:21   MT: michelle      Name:     CASPER KOROMA   MRN:      [id number]        Account:      [id number]   :      1965           Admitted:     [id number]      Document: [id number]

## 2017-03-06 NOTE — PROGRESS NOTES
[facility]    Hospitalist Progress Note    Assessment & Plan      Ms. Rosalia Schulz is a 51-year-old female with a medical history of cerebrovascular accident in 05/2016, iron deficiency anemia, depression, gastroesophageal reflux disease, migraines, obstructive sleep apnea and recent surgery for endometrial adenocarcinoma with total abdominal hysterectomy and bilateral salpingo-oophorectomy with lymphadenectomy who presented after having postop fever and abdominal pain    1. Sepsis due to likely intra-abdominal infection/Postop fever with intra-abdominal fluid collection-question developing abscess/:   -Patient presented with leukocytosis, high-grade fever, hypotension and suspected source of infection intra-abdominally  -It appears that Ms. Schulz has a likely postoperative infection.   -She is approximately 1-1/2 weeks out from her surgery.   -She had a fever to 103.5.   -Continue vancomycin and Zosyn  -Follow-up cultures from [facility]   -Gynecology to see the patient today   -Infectious disease consult for antibiotics management  -Likely will need IR guided drain, defer that to the GYN    2. Mildly elevated liver function tests.   -Transaminases done at [facility] was greater than 200s  -Etiology unclear, question due to infectious process  -Trending down  -Await formal report on CT abdomen  -Monitor LFTs, will recheck in the morning    3. History of cerebrovascular accident.   -No active issues.  We will hold her aspirin in case surgery is needed.     4. History of iron deficiency anemia.   -At baseline  -Monitor    5. Depression. Continue fluoxetine.     6. Gastroesophageal reflux disease. Continue omeprazole.     7. Migraines. Will have p.r.n. Imitrex available.     8. Obstructive sleep apnea.  CPAP with home settings.      D/W: RN  DVT Prophylaxis: Pneumatic Compression Devices  Code Status: Full Code    Disposition: Expected discharge pending clinical course     Interval History    Endorses ongoing abdominal pain which is generalized.  Afebrile this morning.  No nausea or vomiting.  No diarrhea.  Last bowel movement Saturday      -Data reviewed today: I reviewed all new labs and imaging results over the last 24 hours. I personally reviewed the EKG tracing showing nsr.    Physical Exam   Temp: 98.7  F (37.1  C) Temp src: Oral BP: 111/67   Heart Rate: 66 Resp: 12 SpO2: 91 % O2 Device: None (Room air)    Vitals:    03/06/17 0230   Weight: 100.3 kg (221 lb 1.9 oz)     Vital Signs with Ranges  Temp:  [98.7  F (37.1  C)-98.9  F (37.2  C)] 98.7  F (37.1  C)  Heart Rate:  [63-76] 66  Resp:  [11-13] 12  BP: ()/(56-67) 111/67  SpO2:  [91 %-99 %] 91 %  I/O last 3 completed shifts:  In: 364.58 [I.V.:364.58]  Out: -     Constitutional: AA O X3, NAD,sleepy:  HEENT:Moist oral mucosa, no oral lesions, No pallor or icterus  Neck- Supple, Good ROM, No JVD  Respiratory:  No crackles, No wheezes, CTA B/L, Normal WOB  Cardiovascular: RRR, No murmur  GI: Soft,mild nonspecific tenderness throughout her abdomen more so in the left lower quadrant, no guarding or rebound; bowel sounds normoactive  Skin/Integument: Warm and dry, no rashes  MSK: No joint deformity or swelling, no edema  Neuro: CN- grossly intact    Medications     D5W 125 mL/hr at 03/06/17 0742       FLUoxetine (PROzac) capsule 40 mg  40 mg Oral Daily     omeprazole (priLOSEC) CR capsule 20 mg  20 mg Oral QAM     piperacillin-tazobactam  3.375 g Intravenous Q6H     vancomycin (VANCOCIN) IV  1,500 mg Intravenous Q12H       Data     Recent Labs  Lab 03/06/17  0455   WBC 10.3   HGB 10.0*   MCV 76*         POTASSIUM 3.6   CHLORIDE 109   CO2 23   BUN 13   CR 0.75   ANIONGAP 7   DULCE 8.3*   *   ALBUMIN 2.6*   PROTTOTAL 6.2*   BILITOTAL 0.6   ALKPHOS 186*   *   *       No results found for this or any previous visit (from the past 24 hour(s)).

## 2017-03-06 NOTE — IP AVS SNAPSHOT
MRN:[id number]                      After Visit Summary   3/6/2017    Rosalia Schulz    MRN: [id number]           Thank you!     Thank you for choosing [facility] for your care. Our goal is always to provide you with excellent care. Hearing back from our patients is one way we can continue to improve our services. Please take a few minutes to complete the written survey that you may receive in the mail after you visit with us. Thank you!        Patient Information     Date Of Birth          1965        About your hospital stay     You were admitted on:  March 6, 2017 You last received care in the:  [place] [address] [facility]    You were discharged on:  March 11, 2017        Reason for your hospital stay       Infection, pain, fever                  Who to Call     For medical emergencies, please call 911.  For non-urgent questions about your medical care, please call your primary care provider or clinic, [phone]          Attending Provider     Provider Specialty    Pasha Luna MD Internal Medicine       Primary Care Provider Office Phone # Fax #    Joby Hammonds PA-C [phone]       [address] [address]         When to contact your care team       Worsening abdominal pain, nausea, vomiting, temp >100.5, significant vaginal drainage or bleeding.                  After Care Instructions     Activity       Your activity upon discharge: As tolerated, no lifting >20lbs for 6 weeks post op, nothing in the vagina for 8 weeks post op.            Diet       Follow this diet upon discharge:      Regular Diet Adult                  Follow-up Appointments     Follow-up and recommended labs and tests        See NP or PA in 10 days for HFU.            Follow-up and recommended labs and tests        1) Follow up with primary care provider, Joby Hammonds, within 7 days to evaluate medication change and for hospital follow- up.  The  "following labs/tests are recommended: CMP.    2) Follow up with [facility], Call for appointment.   Office                   Pending Results     Date and Time Order Name Status Description    3/11/2017 0000 Mitochondrial M2 Antibody IgG In process     3/10/2017 0924 Hepatitis A Antibody IgG In process     3/10/2017 0924 EBV DNA PCR Quantitative Whole Blood In process     3/10/2017 0924 Hepatitis B Surface Antibody In process     3/10/2017 0845 Antinuclear antibody screen by [facility] In process     3/7/2017 0755 Blood culture Preliminary             Statement of Approval     Ordered          17 1520  I have reviewed and agree with all the recommendations and orders detailed in this document.  EFFECTIVE NOW     Approved and electronically signed by:  Mark Vargas MD             Admission Information     Date & Time Provider Department Dept. Phone    3/6/2017 Pasha Luna MD [place] [address] [facility] [phone]      Your Vitals Were     Blood Pressure Pulse Temperature Respirations Height Weight    115/54 66 96.5  F (35.8  C) (Oral) 16 1.676 m (5' 6\") 103.2 kg (227 lb 8.2 oz)    Pulse Oximetry BMI (Body Mass Index)                97% 36.72 kg/m2          MyChart Information     [employer] lets you send messages to your doctor, view your test results, renew your prescriptions, schedule appointments and more. To sign up, go to [email or web address] . Click on \"Log in\" on the left side of the screen, which will take you to the Welcome page. Then click on \"Sign up Now\" on the right side of the page.     You will be asked to enter the access code listed below, as well as some personal information. Please follow the directions to create your username and password.     Your access code is: [email or web address]  Expires: 2017  1:36 PM     Your access code will  in 90 days. If you need help or a new code, please call your [facility] clinic or [phone].        Care EveryWhere " ID     This is your Care EveryWhere ID. This could be used by other organizations to access your [facility] medical records  [id number]           Review of your medicines      START taking        Dose / Directions    levofloxacin 500 MG tablet   Commonly known as:  LEVAQUIN   Indication:  Infection Within the Abdomen        Dose:  500 mg   Start taking on:  3/12/2017   Take 1 tablet (500 mg) by mouth daily for 10 days   Quantity:  10 tablet   Refills:  0       metroNIDAZOLE 500 MG tablet   Commonly known as:  FLAGYL   Indication:  Infection Within the Abdomen        Dose:  500 mg   Take 1 tablet (500 mg) by mouth every 8 hours for 28 doses   Quantity:  28 tablet   Refills:  0         CONTINUE these medicines which have NOT CHANGED        Dose / Directions    ASPIRIN PO   Indication:  Cerebrovascular Accident or Stroke        Dose:  81 mg   Take 81 mg by mouth daily   Refills:  0       ATORVASTATIN CALCIUM PO        Dose:  10 mg   Take 10 mg by mouth daily   Refills:  0       ibuprofen 600 MG tablet   Commonly known as:  ADVIL/MOTRIN   Used for:  Endometrial cancer (H)        Dose:  600 mg   Take 1 tablet (600 mg) by mouth every 6 hours as needed for pain (mild)   Quantity:  30 tablet   Refills:  0       IMITREX PO        Dose:  50 mg   Take 50 mg by mouth May repeat after two hours.  Maximum dose 200 mg/24 hours   Refills:  0       Multi-vitamin Tabs tablet        Dose:  1 tablet   Take 1 tablet by mouth daily   Refills:  0       OMEPRAZOLE PO        Dose:  20 mg   Take 20 mg by mouth every morning   Refills:  0       PROZAC PO        Dose:  40 mg   Take 40 mg by mouth daily   Refills:  0            Where to get your medicines      [facility] [address] [place] [address] [address]  [address] [facility] [address]     Phone:  [phone]     levofloxacin 500 MG tablet    metroNIDAZOLE 500 MG tablet                Protect  others around you: Learn how to safely use, store and throw away your medicines at www.disposemymeds.org.             Medication List: This is a list of all your medications and when to take them. Check marks below indicate your daily home schedule. Keep this list as a reference.      Medications           Morning Afternoon Evening Bedtime As Needed    ASPIRIN PO   Take 81 mg by mouth daily   Last time this was given:  81 mg on 3/11/2017  8:45 AM                                ATORVASTATIN CALCIUM PO   Take 10 mg by mouth daily                                ibuprofen 600 MG tablet   Commonly known as:  ADVIL/MOTRIN   Take 1 tablet (600 mg) by mouth every 6 hours as needed for pain (mild)   Last time this was given:  600 mg on 3/10/2017 10:06 PM                                IMITREX PO   Take 50 mg by mouth May repeat after two hours.  Maximum dose 200 mg/24 hours                                levofloxacin 500 MG tablet   Commonly known as:  LEVAQUIN   Take 1 tablet (500 mg) by mouth daily for 10 days   Start taking on:  3/12/2017   Last time this was given:  500 mg on 3/11/2017  8:44 AM                                metroNIDAZOLE 500 MG tablet   Commonly known as:  FLAGYL   Take 1 tablet (500 mg) by mouth every 8 hours for 28 doses   Last time this was given:  500 mg on 3/11/2017  6:49 AM                                Multi-vitamin Tabs tablet   Take 1 tablet by mouth daily                                OMEPRAZOLE PO   Take 20 mg by mouth every morning   Last time this was given:  20 mg on 3/11/2017  8:44 AM                                PROZAC PO   Take 40 mg by mouth daily   Last time this was given:  40 mg on 3/11/2017  8:44 AM

## 2017-03-07 ENCOUNTER — ANESTHESIA (OUTPATIENT)
Dept: ONCOLOGY | Facility: CLINIC | Age: 52
DRG: 862 | End: 2017-03-07
Payer: COMMERCIAL

## 2017-03-07 ENCOUNTER — ANESTHESIA EVENT (OUTPATIENT)
Dept: ONCOLOGY | Facility: CLINIC | Age: 52
DRG: 862 | End: 2017-03-07
Payer: COMMERCIAL

## 2017-03-07 LAB
ALBUMIN SERPL-MCNC: 2.4 G/DL (ref 3.4–5)
ALP SERPL-CCNC: 164 U/L (ref 40–150)
ALT SERPL W P-5'-P-CCNC: 115 U/L (ref 0–50)
ANION GAP SERPL CALCULATED.3IONS-SCNC: 9 MMOL/L (ref 3–14)
AST SERPL W P-5'-P-CCNC: 54 U/L (ref 0–45)
BILIRUB DIRECT SERPL-MCNC: 0.3 MG/DL (ref 0–0.2)
BILIRUB SERPL-MCNC: 0.6 MG/DL (ref 0.2–1.3)
BUN SERPL-MCNC: 8 MG/DL (ref 7–30)
C DIFF TOX B STL QL: NORMAL
CALCIUM SERPL-MCNC: 8.5 MG/DL (ref 8.5–10.1)
CHLORIDE SERPL-SCNC: 104 MMOL/L (ref 94–109)
CO2 SERPL-SCNC: 23 MMOL/L (ref 20–32)
CREAT SERPL-MCNC: 0.78 MG/DL (ref 0.52–1.04)
ERYTHROCYTE [DISTWIDTH] IN BLOOD BY AUTOMATED COUNT: 21.9 % (ref 10–15)
GFR SERPL CREATININE-BSD FRML MDRD: 77 ML/MIN/1.7M2
GLUCOSE SERPL-MCNC: 123 MG/DL (ref 70–99)
HCT VFR BLD AUTO: 28.5 % (ref 35–47)
HGB BLD-MCNC: 8.9 G/DL (ref 11.7–15.7)
MCH RBC QN AUTO: 23.4 PG (ref 26.5–33)
MCHC RBC AUTO-ENTMCNC: 31.2 G/DL (ref 31.5–36.5)
MCV RBC AUTO: 75 FL (ref 78–100)
PLATELET # BLD AUTO: 158 10E9/L (ref 150–450)
POTASSIUM SERPL-SCNC: 3.3 MMOL/L (ref 3.4–5.3)
POTASSIUM SERPL-SCNC: 3.5 MMOL/L (ref 3.4–5.3)
PROT SERPL-MCNC: 6.1 G/DL (ref 6.8–8.8)
RBC # BLD AUTO: 3.8 10E12/L (ref 3.8–5.2)
SODIUM SERPL-SCNC: 136 MMOL/L (ref 133–144)
SPECIMEN SOURCE: NORMAL
WBC # BLD AUTO: 9.2 10E9/L (ref 4–11)

## 2017-03-07 PROCEDURE — 99233 SBSQ HOSP IP/OBS HIGH 50: CPT | Performed by: INTERNAL MEDICINE

## 2017-03-07 PROCEDURE — 25000125 ZZHC RX 250: Performed by: INTERNAL MEDICINE

## 2017-03-07 PROCEDURE — 37000011 ZZH ANESTHESIA WARD SERVICE: Performed by: NURSE ANESTHETIST, CERTIFIED REGISTERED

## 2017-03-07 PROCEDURE — 25000128 H RX IP 250 OP 636: Performed by: INTERNAL MEDICINE

## 2017-03-07 PROCEDURE — 25000132 ZZH RX MED GY IP 250 OP 250 PS 637: Performed by: INTERNAL MEDICINE

## 2017-03-07 PROCEDURE — S5010 5% DEXTROSE AND 0.45% SALINE: HCPCS | Performed by: INTERNAL MEDICINE

## 2017-03-07 PROCEDURE — 87493 C DIFF AMPLIFIED PROBE: CPT | Performed by: INTERNAL MEDICINE

## 2017-03-07 PROCEDURE — 40000671 ZZH STATISTIC ANESTHESIA CASE

## 2017-03-07 PROCEDURE — 84132 ASSAY OF SERUM POTASSIUM: CPT | Performed by: INTERNAL MEDICINE

## 2017-03-07 PROCEDURE — 85027 COMPLETE CBC AUTOMATED: CPT | Performed by: INTERNAL MEDICINE

## 2017-03-07 PROCEDURE — 82248 BILIRUBIN DIRECT: CPT | Performed by: INTERNAL MEDICINE

## 2017-03-07 PROCEDURE — 36415 COLL VENOUS BLD VENIPUNCTURE: CPT | Performed by: INTERNAL MEDICINE

## 2017-03-07 PROCEDURE — 25800025 ZZH RX 258: Performed by: INTERNAL MEDICINE

## 2017-03-07 PROCEDURE — 87040 BLOOD CULTURE FOR BACTERIA: CPT | Performed by: INTERNAL MEDICINE

## 2017-03-07 PROCEDURE — 25000128 H RX IP 250 OP 636: Performed by: NURSE PRACTITIONER

## 2017-03-07 PROCEDURE — 80053 COMPREHEN METABOLIC PANEL: CPT | Performed by: INTERNAL MEDICINE

## 2017-03-07 PROCEDURE — 82565 ASSAY OF CREATININE: CPT | Performed by: INTERNAL MEDICINE

## 2017-03-07 PROCEDURE — 12000000 ZZH R&B MED SURG/OB

## 2017-03-07 RX ORDER — POTASSIUM CHLORIDE 1500 MG/1
20-40 TABLET, EXTENDED RELEASE ORAL
Status: DISCONTINUED | OUTPATIENT
Start: 2017-03-07 | End: 2017-03-11 | Stop reason: HOSPADM

## 2017-03-07 RX ORDER — POTASSIUM CHLORIDE 7.45 MG/ML
10 INJECTION INTRAVENOUS
Status: DISCONTINUED | OUTPATIENT
Start: 2017-03-07 | End: 2017-03-11 | Stop reason: HOSPADM

## 2017-03-07 RX ORDER — POTASSIUM CHLORIDE 29.8 MG/ML
20 INJECTION INTRAVENOUS
Status: DISCONTINUED | OUTPATIENT
Start: 2017-03-07 | End: 2017-03-11 | Stop reason: HOSPADM

## 2017-03-07 RX ORDER — POTASSIUM CHLORIDE 1.5 G/1.58G
20-40 POWDER, FOR SOLUTION ORAL
Status: DISCONTINUED | OUTPATIENT
Start: 2017-03-07 | End: 2017-03-11 | Stop reason: HOSPADM

## 2017-03-07 RX ADMIN — ONDANSETRON 4 MG: 4 TABLET, ORALLY DISINTEGRATING ORAL at 08:08

## 2017-03-07 RX ADMIN — POTASSIUM CHLORIDE 40 MEQ: 1500 TABLET, EXTENDED RELEASE ORAL at 10:04

## 2017-03-07 RX ADMIN — ACETAMINOPHEN 650 MG: 325 TABLET, FILM COATED ORAL at 13:39

## 2017-03-07 RX ADMIN — FLUOXETINE HYDROCHLORIDE 40 MG: 20 CAPSULE ORAL at 10:04

## 2017-03-07 RX ADMIN — HYDROMORPHONE HYDROCHLORIDE 0.5 MG: 1 INJECTION, SOLUTION INTRAMUSCULAR; INTRAVENOUS; SUBCUTANEOUS at 05:45

## 2017-03-07 RX ADMIN — HYDROMORPHONE HYDROCHLORIDE 0.5 MG: 1 INJECTION, SOLUTION INTRAMUSCULAR; INTRAVENOUS; SUBCUTANEOUS at 22:21

## 2017-03-07 RX ADMIN — ACETAMINOPHEN 650 MG: 325 TABLET, FILM COATED ORAL at 08:08

## 2017-03-07 RX ADMIN — LORAZEPAM 0.5 MG: 2 INJECTION INTRAMUSCULAR; INTRAVENOUS at 23:06

## 2017-03-07 RX ADMIN — DEXTROSE AND SODIUM CHLORIDE: 5; 450 INJECTION, SOLUTION INTRAVENOUS at 11:26

## 2017-03-07 RX ADMIN — POTASSIUM CHLORIDE 20 MEQ: 1500 TABLET, EXTENDED RELEASE ORAL at 13:00

## 2017-03-07 RX ADMIN — PIPERACILLIN SODIUM,TAZOBACTAM SODIUM 3.38 G: 3; .375 INJECTION, POWDER, FOR SOLUTION INTRAVENOUS at 11:25

## 2017-03-07 RX ADMIN — HYDROMORPHONE HYDROCHLORIDE 0.5 MG: 1 INJECTION, SOLUTION INTRAMUSCULAR; INTRAVENOUS; SUBCUTANEOUS at 00:49

## 2017-03-07 RX ADMIN — PIPERACILLIN SODIUM,TAZOBACTAM SODIUM 3.38 G: 3; .375 INJECTION, POWDER, FOR SOLUTION INTRAVENOUS at 18:30

## 2017-03-07 RX ADMIN — ACETAMINOPHEN 650 MG: 325 TABLET, FILM COATED ORAL at 18:35

## 2017-03-07 RX ADMIN — PIPERACILLIN SODIUM,TAZOBACTAM SODIUM 3.38 G: 3; .375 INJECTION, POWDER, FOR SOLUTION INTRAVENOUS at 05:53

## 2017-03-07 RX ADMIN — PIPERACILLIN SODIUM,TAZOBACTAM SODIUM 3.38 G: 3; .375 INJECTION, POWDER, FOR SOLUTION INTRAVENOUS at 00:54

## 2017-03-07 RX ADMIN — LORAZEPAM 0.5 MG: 2 INJECTION INTRAMUSCULAR; INTRAVENOUS at 07:37

## 2017-03-07 RX ADMIN — OMEPRAZOLE 20 MG: 20 CAPSULE, DELAYED RELEASE ORAL at 10:04

## 2017-03-07 ASSESSMENT — PAIN DESCRIPTION - DESCRIPTORS: DESCRIPTORS: HEADACHE

## 2017-03-07 NOTE — PROGRESS NOTES
"[facility]  Hospitalist Progress Note          Assessment and Plan:     Abdominal infection (H):  Clinicially improved on exam and has had BM.  No nausea. Ambulating.  Would continue current antibiotics until afebrile and WBC normalized then can consider Augmentin and diflucan or other recommendation per ID.   Endometrial cancer:  No adjuvant therapy needed  Poor IV access:  If another IV needs to be placed would strongly recommend PICC               Interval History:   doing well; no cp, sob, n/v/d, abdominal pain much improved.  Slept overnight              Medications:   I have reviewed this patient's current medications               Physical Exam:   Blood pressure 101/52, temperature 101.8  F (38.8  C), temperature source Axillary, resp. rate 18, height 1.676 m (5' 6\"), weight 100.3 kg (221 lb 1.9 oz), SpO2 91 %.        Vital Sign Ranges  Temperature Temp  Av.7  F (38.2  C)  Min: 98.4  F (36.9  C)  Max: 103.6  F (39.8  C)   Blood pressure Systolic (24hrs), Av , Min:98 , Max:137        Diastolic (24hrs), Av, Min:51, Max:80      Pulse No Data Recorded   Respirations Resp  Avg: 15.3  Min: 9  Max: 19   Pulse oximetry SpO2  Av.2 %  Min: 85 %  Max: 100 %         Intake/Output Summary (Last 24 hours) at 17 0840  Last data filed at 17 0500   Gross per 24 hour   Intake          3801.67 ml   Output                0 ml   Net          3801.67 ml       Lungs:   Clear      Cardiovascular:   Normal apical impulse, regular rate and rhythm, normal S1 and S2, no S3 or S4, and no murmur noted     Abdomen:   BS more active, less distended, non-tender to palpation     Musculoskeletal:   no lower extremity pitting edema present                Data:   All laboratory data reviewed  "

## 2017-03-07 NOTE — PROGRESS NOTES
[facility]    Hospitalist Progress Note    Assessment & Plan      Ms. Rosalia Schulz is a 51-year-old female with a medical history of cerebrovascular accident in 05/2016, iron deficiency anemia, depression, gastroesophageal reflux disease, migraines, obstructive sleep apnea and recent surgery for endometrial adenocarcinoma with total abdominal hysterectomy and bilateral salpingo-oophorectomy with lymphadenectomy who presented after having postop fever and abdominal pain    1. Sepsis due to likely intra-abdominal infection/Postop fever with intra-abdominal fluid collection:   -Patient presented with leukocytosis, high-grade fever, hypotension and suspected source of infection intra-abdominally  -She is approximately 1-1/2 weeks out from her surgery. ( total abdominal hysterectomy and bilateral salpingo-oophorectomy with lymphadenectomy   for endometrial adenocarcinoma with Dr Weldon on 2/23)  -She had  fever upto 103.5.   -Initially received vancomycin and Zosyn  -Appreciate ID consult  -Continue Zosyn   -Follow-up cultures from [facility]-no new culture data  yet  -Gynecology following  -Was seen by interventional radiology 3/6, with initial plan for intra-abdominal fluid aspiration however due to significant decrease in size of the fluid(within 24 hours) drainage of the fluid was not attempted  -Plan at this time is to continue to treat with IV antibiotics and follow clinical course    2. Mildly elevated liver function tests.   -Transaminases done at [facility] was greater than 200s  -Bilirubin normal  -Etiology unclear, question due to infectious process  -Trending down  -Hold statin for now  -Recheck in the morning    3. History of cerebrovascular accident.   -No active issues.    -Resume aspirin once clear that intervention is not needed    4. History of iron deficiency anemia.   -Baseline is 10-12  -Slight drop from 10-8.9 overnight; probably dilutional  -Monitor    5. Depression. Continue  fluoxetine.     6. Gastroesophageal reflux disease. Continue omeprazole.     7. Migraines. Will have p.r.n. Imitrex available.     8. Obstructive sleep apnea.  CPAP with home settings.      D/W: RN  DVT Prophylaxis: Pneumatic Compression Devices  Code Status: Full Code    Disposition: Expected discharge pending clinical course     Interval History   Fever of 101.8 this morning.  Abdominal pain slightly better.  Denies nausea or vomiting.  No cough, no dysuria    -Data reviewed today: I reviewed all new labs and imaging results over the last 24 hours. I personally reviewed the EKG tracing showing nsr.    Physical Exam   Temp: 101.8  F (38.8  C) Temp src: Axillary BP: 101/52   Heart Rate: 86 Resp: 18 SpO2: 91 % O2 Device: None (Room air) Oxygen Delivery: 4 LPM  Vitals:    03/06/17 0230   Weight: 100.3 kg (221 lb 1.9 oz)     Vital Signs with Ranges  Temp:  [98.4  F (36.9  C)-103.6  F (39.8  C)] 101.8  F (38.8  C)  Heart Rate:  [65-86] 86  Resp:  [9-19] 18  BP: ()/(51-80) 101/52  SpO2:  [91 %-100 %] 91 %  I/O last 3 completed shifts:  In: 3801.67 [P.O.:120; I.V.:3681.67]  Out: -     Constitutional: AA O X3, NAD,sleepy:  HEENT:Moist oral mucosa, no oral lesions, No pallor or icterus  Neck- Supple, Good ROM, No JVD  Respiratory:  No crackles, No wheezes, CTA B/L, Normal WOB  Cardiovascular: RRR, No murmur  GI: Soft,mild nonspecific tenderness throughout her abdomen more so in the left lower quadrant, probably slightly better compared to yesterday; no guarding or rebound; bowel sounds normoactive  Skin/Integument: Warm and dry, no rashes  MSK: No joint deformity or swelling, no edema  Neuro: CN- grossly intact    Medications     dextrose 5% and 0.45% NaCl 100 mL/hr at 03/07/17 0500       FLUoxetine (PROzac) capsule 40 mg  40 mg Oral Daily     omeprazole (priLOSEC) CR capsule 20 mg  20 mg Oral QAM     piperacillin-tazobactam  3.375 g Intravenous Q6H     LORazepam  0.5 mg Intravenous Once       Data     Recent  Labs  Lab 03/07/17  0745 03/06/17  1115 03/06/17  0455   WBC 9.2  --  10.3   HGB 8.9*  --  10.0*   MCV 75*  --  76*     --  161   INR  --  1.20*  --      --  139   POTASSIUM 3.3*  --  3.6   CHLORIDE 104  --  109   CO2 23  --  23   BUN 8  --  13   CR 0.78  --  0.75   ANIONGAP 9  --  7   DULCE 8.5  --  8.3*   *  --  123*   ALBUMIN 2.4*  --  2.6*   PROTTOTAL 6.1*  --  6.2*   BILITOTAL 0.6  --  0.6   ALKPHOS 164*  --  186*   *  --  177*   AST 54*  --  119*       Recent Results (from the past 24 hour(s))   CT Pelvis w/o Contrast    Narrative    CT PELVIS WITHOUT CONTRAST 3/6/2017 3:49 PM     HISTORY: Possible abscess drain, peritoneal abscess drain.    COMPARISON: 3/5/2017.    TECHNIQUE: Volumetric helical acquisition of axial CT image data were  acquired through the pelvis without intravenous or oral contrast. The  axial image data were used to reconstructed sagittal and coronal  images. Radiation dose for this scan was reduced using automated  exposure control, adjustment of the mA and/or kV according to patient  size, or iterative reconstruction technique.    FINDINGS: Marked interval reduction in fluid since the comparison  study. The fluid collection previously measured 10.3 x 7.2 cm, now  measures 5.7 x 3.1 cm. Given the relatively rapid decrease in size in  the fluid since the comparison study only one day prior, this would be  incompatible with an abscess, and aspiration was not attempted. No  bowel obstruction demonstrated.      Impression    IMPRESSION: Marked interval improvement in pelvic fluid collection in  one day since the comparison study.    VELIA HOANG MD

## 2017-03-07 NOTE — PLAN OF CARE
Problem: Goal Outcome Summary  Goal: Goal Outcome Summary  Outcome: No Change  Temp 101.8 axillary, all other VS WDL.  Oriented x4, lethargic, c/o weakness/shakiness when ambulating.  Two episodes of fecal incontinence, loose watery brown stool.  Pt is on enteric precautions to rule out c. Difficle, culture is pending.  Potassium replaced on days, redraw scheduled at 1700.  Lap sites open to air, right lower quad lap site scabbing.  Difficulty with IV placement and blood draw, pt is anxious about this and needs to be premedicated with IV ativan.

## 2017-03-07 NOTE — PROGRESS NOTES
[facility]    Infectious Disease Progress Note    Date of Service (when I saw the patient): 03/07/2017     Assessment & Plan   Rosalia Schulz is a 51 year old female who was admitted on 3/6/2017.     Impression:   51 y.o with h/o endometrial adenocarcinoma with total abdominal hysterectomy and bilateral salpingo-oophorectomy with lymphadenectomy done on 2/23.   Admitted this occasion with fever, abdominal pain, leucocytosis and CT abdomen with fluid collection concerning for abscess.   Seen by Gyn no plans for any drainage given decrease in the size of fluid collection on the repeat CT scan.   On zosyn.      Recommendations:   Continue on zosyn  Follow temp curve, symptoms.         Ivonne Mckay MD    Interval History   T max 103.6  Resolution of some of the fluid seen in the CT scan previously.   No positive microbiological data     Physical Exam   Temp: 101.8  F (38.8  C) Temp src: Axillary BP: 101/52   Heart Rate: 86 Resp: 18 SpO2: 91 % O2 Device: None (Room air) Oxygen Delivery: 4 LPM  Vitals:    03/06/17 0230   Weight: 100.3 kg (221 lb 1.9 oz)     Vital Signs with Ranges  Temp:  [98.4  F (36.9  C)-103.6  F (39.8  C)] 101.8  F (38.8  C)  Heart Rate:  [65-86] 86  Resp:  [9-19] 18  BP: ()/(51-80) 101/52  SpO2:  [91 %-100 %] 91 %    Constitutional: Awake, alert, cooperative, no apparent distress  Lungs: Clear to auscultation bilaterally, no crackles or wheezing  Cardiovascular: Regular rate and rhythm, normal S1 and S2, and no murmur noted  Abdomen: Normal bowel sounds, soft, non-distended, non-tender  Skin: No rashes, no cyanosis, no edema  Other:    Medications     dextrose 5% and 0.45% NaCl 100 mL/hr at 03/07/17 0500       FLUoxetine (PROzac) capsule 40 mg  40 mg Oral Daily     omeprazole (priLOSEC) CR capsule 20 mg  20 mg Oral QAM     piperacillin-tazobactam  3.375 g Intravenous Q6H     LORazepam  0.5 mg Intravenous Once       Data   All microbiology laboratory data reviewed.  Recent Labs    Lab Test  03/07/17   0745  03/06/17   0455  02/23/17   0826   WBC  9.2  10.3   --    HGB  8.9*  10.0*  12.0   HCT  28.5*  32.0*   --    MCV  75*  76*   --    PLT  158  161   --      Recent Labs   Lab Test  03/07/17   0745  03/06/17   0455   CR  0.78  0.75     No lab results found.  Recent Labs   Lab Test  03/07/17   0755   CULT  Pending

## 2017-03-07 NOTE — ANESTHESIA CARE TRANSFER NOTE
Patient: Rosalia Schulz    * No procedures listed *    Diagnosis: * No pre-op diagnosis entered *  Diagnosis Additional Information: No value filed.    Anesthesia Type:   No value filed.     Note:  Airway :Room Air  Destination: [facility] room 834.  Comments: Iv Placed. RN at bedside. Report given      Vitals: (Last set prior to Anesthesia Care Transfer)              Electronically Signed By: CAYDEN Narayanan CRNA  March 7, 2017  12:54 AM

## 2017-03-07 NOTE — PLAN OF CARE
Problem: Goal Outcome Summary  Goal: Goal Outcome Summary  Outcome: No Change  VSS, tmax 103.6, A/O x4. Patient had IV replaced to right hand by anaesthesilogy after attempts by flying squad. Recommend PICC if pt needs on-going ABX as pt has extreme anxiety/panic w/ needles. Pt had Dilaudid x2 for ABD pain. Lap Sites CDI, ABD tender and rounded, BS present had lrg soft BM- CMS intact. Cl liq diet-tolerating well. Up SBA,.Slept well this shift

## 2017-03-07 NOTE — PLAN OF CARE
Problem: Goal Outcome Summary  Goal: Goal Outcome Summary  A&O ex anxious. VSS ex low grade temp 100.2, on 4L O2. Up with A1. Pain managed with IV dilaudid and tylenol. CMS intact. Abdominal lap sites MAGDALENO. BS hypo. LS clear. Voiding adequately.

## 2017-03-08 LAB
ALBUMIN SERPL-MCNC: 2.2 G/DL (ref 3.4–5)
ALP SERPL-CCNC: 168 U/L (ref 40–150)
ALT SERPL W P-5'-P-CCNC: 111 U/L (ref 0–50)
ANION GAP SERPL CALCULATED.3IONS-SCNC: 6 MMOL/L (ref 3–14)
AST SERPL W P-5'-P-CCNC: 65 U/L (ref 0–45)
BILIRUB SERPL-MCNC: 0.5 MG/DL (ref 0.2–1.3)
BUN SERPL-MCNC: 8 MG/DL (ref 7–30)
CALCIUM SERPL-MCNC: 8.8 MG/DL (ref 8.5–10.1)
CHLORIDE SERPL-SCNC: 110 MMOL/L (ref 94–109)
CO2 SERPL-SCNC: 27 MMOL/L (ref 20–32)
CREAT SERPL-MCNC: 0.8 MG/DL (ref 0.52–1.04)
ERYTHROCYTE [DISTWIDTH] IN BLOOD BY AUTOMATED COUNT: 22.1 % (ref 10–15)
GFR SERPL CREATININE-BSD FRML MDRD: 76 ML/MIN/1.7M2
GLUCOSE SERPL-MCNC: 97 MG/DL (ref 70–99)
HCT VFR BLD AUTO: 28.6 % (ref 35–47)
HGB BLD-MCNC: 8.9 G/DL (ref 11.7–15.7)
MCH RBC QN AUTO: 23.5 PG (ref 26.5–33)
MCHC RBC AUTO-ENTMCNC: 31.1 G/DL (ref 31.5–36.5)
MCV RBC AUTO: 76 FL (ref 78–100)
PLATELET # BLD AUTO: 163 10E9/L (ref 150–450)
POTASSIUM SERPL-SCNC: 3.5 MMOL/L (ref 3.4–5.3)
PROT SERPL-MCNC: 6.2 G/DL (ref 6.8–8.8)
RBC # BLD AUTO: 3.79 10E12/L (ref 3.8–5.2)
SODIUM SERPL-SCNC: 143 MMOL/L (ref 133–144)
WBC # BLD AUTO: 6.6 10E9/L (ref 4–11)

## 2017-03-08 PROCEDURE — 25000132 ZZH RX MED GY IP 250 OP 250 PS 637: Performed by: INTERNAL MEDICINE

## 2017-03-08 PROCEDURE — 94660 CPAP INITIATION&MGMT: CPT

## 2017-03-08 PROCEDURE — 25000132 ZZH RX MED GY IP 250 OP 250 PS 637: Performed by: PHYSICIAN ASSISTANT

## 2017-03-08 PROCEDURE — 99233 SBSQ HOSP IP/OBS HIGH 50: CPT | Performed by: INTERNAL MEDICINE

## 2017-03-08 PROCEDURE — 99207 ZZC CDG-MDM COMPONENT: MEETS HIGH - UP CODED: CPT | Performed by: INTERNAL MEDICINE

## 2017-03-08 PROCEDURE — S5010 5% DEXTROSE AND 0.45% SALINE: HCPCS | Performed by: INTERNAL MEDICINE

## 2017-03-08 PROCEDURE — 85027 COMPLETE CBC AUTOMATED: CPT | Performed by: INTERNAL MEDICINE

## 2017-03-08 PROCEDURE — 40000275 ZZH STATISTIC RCP TIME EA 10 MIN

## 2017-03-08 PROCEDURE — 80053 COMPREHEN METABOLIC PANEL: CPT | Performed by: INTERNAL MEDICINE

## 2017-03-08 PROCEDURE — 12000000 ZZH R&B MED SURG/OB

## 2017-03-08 PROCEDURE — 25000128 H RX IP 250 OP 636: Performed by: INTERNAL MEDICINE

## 2017-03-08 PROCEDURE — 25800025 ZZH RX 258: Performed by: INTERNAL MEDICINE

## 2017-03-08 PROCEDURE — 36415 COLL VENOUS BLD VENIPUNCTURE: CPT | Performed by: INTERNAL MEDICINE

## 2017-03-08 RX ORDER — ATORVASTATIN CALCIUM 10 MG/1
10 TABLET, FILM COATED ORAL DAILY
Status: DISCONTINUED | OUTPATIENT
Start: 2017-03-08 | End: 2017-03-08

## 2017-03-08 RX ORDER — IBUPROFEN 600 MG/1
600 TABLET, FILM COATED ORAL EVERY 6 HOURS PRN
Status: DISCONTINUED | OUTPATIENT
Start: 2017-03-08 | End: 2017-03-11 | Stop reason: HOSPADM

## 2017-03-08 RX ORDER — OXYCODONE AND ACETAMINOPHEN 5; 325 MG/1; MG/1
1-2 TABLET ORAL EVERY 4 HOURS PRN
Status: DISCONTINUED | OUTPATIENT
Start: 2017-03-08 | End: 2017-03-11 | Stop reason: HOSPADM

## 2017-03-08 RX ADMIN — HYDROMORPHONE HYDROCHLORIDE 0.5 MG: 1 INJECTION, SOLUTION INTRAMUSCULAR; INTRAVENOUS; SUBCUTANEOUS at 07:40

## 2017-03-08 RX ADMIN — ACETAMINOPHEN 650 MG: 325 TABLET, FILM COATED ORAL at 17:21

## 2017-03-08 RX ADMIN — ACETAMINOPHEN 650 MG: 325 TABLET, FILM COATED ORAL at 11:37

## 2017-03-08 RX ADMIN — OMEPRAZOLE 20 MG: 20 CAPSULE, DELAYED RELEASE ORAL at 10:29

## 2017-03-08 RX ADMIN — HYDROMORPHONE HYDROCHLORIDE 0.3 MG: 1 INJECTION, SOLUTION INTRAMUSCULAR; INTRAVENOUS; SUBCUTANEOUS at 12:18

## 2017-03-08 RX ADMIN — PIPERACILLIN SODIUM,TAZOBACTAM SODIUM 3.38 G: 3; .375 INJECTION, POWDER, FOR SOLUTION INTRAVENOUS at 11:37

## 2017-03-08 RX ADMIN — HYDROMORPHONE HYDROCHLORIDE 0.5 MG: 1 INJECTION, SOLUTION INTRAMUSCULAR; INTRAVENOUS; SUBCUTANEOUS at 01:32

## 2017-03-08 RX ADMIN — PIPERACILLIN SODIUM,TAZOBACTAM SODIUM 3.38 G: 3; .375 INJECTION, POWDER, FOR SOLUTION INTRAVENOUS at 00:05

## 2017-03-08 RX ADMIN — PIPERACILLIN SODIUM,TAZOBACTAM SODIUM 3.38 G: 3; .375 INJECTION, POWDER, FOR SOLUTION INTRAVENOUS at 06:03

## 2017-03-08 RX ADMIN — PIPERACILLIN SODIUM,TAZOBACTAM SODIUM 3.38 G: 3; .375 INJECTION, POWDER, FOR SOLUTION INTRAVENOUS at 17:45

## 2017-03-08 RX ADMIN — FLUOXETINE HYDROCHLORIDE 40 MG: 20 CAPSULE ORAL at 10:29

## 2017-03-08 RX ADMIN — HYDROMORPHONE HYDROCHLORIDE 0.5 MG: 1 INJECTION, SOLUTION INTRAMUSCULAR; INTRAVENOUS; SUBCUTANEOUS at 05:06

## 2017-03-08 RX ADMIN — OXYCODONE HYDROCHLORIDE AND ACETAMINOPHEN 2 TABLET: 5; 325 TABLET ORAL at 19:39

## 2017-03-08 RX ADMIN — Medication 3 MG: at 22:50

## 2017-03-08 RX ADMIN — DEXTROSE AND SODIUM CHLORIDE: 5; 450 INJECTION, SOLUTION INTRAVENOUS at 10:33

## 2017-03-08 ASSESSMENT — PAIN DESCRIPTION - DESCRIPTORS
DESCRIPTORS: ACHING;CRAMPING
DESCRIPTORS: ACHING
DESCRIPTORS: ACHING

## 2017-03-08 NOTE — PLAN OF CARE
Problem: Goal Outcome Summary  Goal: Goal Outcome Summary  Outcome: Improving  A/O. Abdominal pain controlled w/PRN IV Dilaudid, transition to PRN PO Percocet. VSS. Anxious at times, encouraged deep breathing exercises. Voiding adequately per bathroom. Lap sites, CDI. Ambulating halls w/1assist/belt. Tolerating regular diet. Will continue to monitor.     Plan: Continue IV Zosyn Q6h; monitor cultures.

## 2017-03-08 NOTE — PROGRESS NOTES
HOSPITALIST CROSS COVER NOTE    Called by RN regarding outside ([facility]) blood culture results.    Per RN, 1/2 blood cultures is positive for Gram Positive rods in anaerobic culture.    Chart, meds, labs reviewed.     Patient presently on IV zosyn. Wound continue that for now. ID has already be consulted. Recommend follow sensitivities on outside blood cultures.       ROXANNA WALSH MD

## 2017-03-08 NOTE — PLAN OF CARE
Problem: Goal Outcome Summary  Goal: Goal Outcome Summary  Outcome: No Change  VSS.  Dilaudid for abdominal pain x1.  Ativan for anxiety x1.  Up with SBA.  Generalized weakness.  Cdiff lab results back negative, enteric isolation discontinued.  Will continue to monitor.

## 2017-03-08 NOTE — PROGRESS NOTES
[facility]    Infectious Disease Progress Note    Date of Service (when I saw the patient): 03/08/2017     Assessment & Plan   Rosalia Schulz is a 51 year old female who was admitted on 3/6/2017.     Impression:   51 y.o with h/o endometrial adenocarcinoma with total abdominal hysterectomy and bilateral salpingo-oophorectomy with lymphadenectomy done on 2/23.   Admitted this occasion with fever, abdominal pain, leucocytosis and CT abdomen with fluid collection concerning for abscess.   Seen by Gyn no plans for any drainage given decrease in the size of fluid collection on the repeat CT scan.   On zosyn.   Cultures from [facility] positive for Gram positive rods in the anaerobic cultures, ? Clostridium. Follow up cultures here are NGTD but given the one from [facility] is an anaerobe it might still get positive.      Recommendations:   Continue on zosyn  Follow temp curve, symptoms.         Ivonne Mckay MD    Interval History   T max 99.2  Resolution of some of the fluid seen in the CT scan previously.   Cultures from [facility] positive for Gram positive rods in the anaerobic cultures, ? Clostridium  Follow up cultures here are negative      Physical Exam   Temp: 99.2  F (37.3  C) Temp src: Oral BP: 104/56 Pulse: 66 Heart Rate: 59 Resp: 16 SpO2: 90 % O2 Device: None (Room air)    Vitals:    03/06/17 0230 03/08/17 0354   Weight: 100.3 kg (221 lb 1.9 oz) 101.5 kg (223 lb 12.8 oz)     Vital Signs with Ranges  Temp:  [96.9  F (36.1  C)-99.2  F (37.3  C)] 99.2  F (37.3  C)  Pulse:  [66-70] 66  Heart Rate:  [58-63] 59  Resp:  [14-16] 16  BP: (104-118)/(56-64) 104/56  SpO2:  [90 %-98 %] 90 %    Constitutional: Awake, alert, cooperative, no apparent distress  Lungs: Clear to auscultation bilaterally, no crackles or wheezing  Cardiovascular: Regular rate and rhythm, normal S1 and S2, and no murmur noted  Abdomen: Normal bowel sounds, soft, non-distended, non-tender  Skin: No rashes, no cyanosis, no  edema  Other:    Medications     dextrose 5% and 0.45% NaCl 50 mL/hr at 03/07/17 1259       FLUoxetine (PROzac) capsule 40 mg  40 mg Oral Daily     omeprazole (priLOSEC) CR capsule 20 mg  20 mg Oral QAM     piperacillin-tazobactam  3.375 g Intravenous Q6H     LORazepam  0.5 mg Intravenous Once       Data   All microbiology laboratory data reviewed.  Recent Labs   Lab Test  03/08/17   0710  03/07/17   0745  03/06/17   0455   WBC  6.6  9.2  10.3   HGB  8.9*  8.9*  10.0*   HCT  28.6*  28.5*  32.0*   MCV  76*  75*  76*   PLT  163  158  161     Recent Labs   Lab Test  03/08/17   0710  03/07/17   0745  03/06/17   0455   CR  0.80  0.78  0.75     No lab results found.  Recent Labs   Lab Test  03/07/17   0755   CULT  No growth after 22 hours

## 2017-03-08 NOTE — PROVIDER NOTIFICATION
Dr. Osman notified regarding positive blood cultures for gram positive rods in an anaerobic bottle.  No orders at this time.

## 2017-03-08 NOTE — PROGRESS NOTES
"[facility]  Hospitalist Progress Note          Assessment and Plan:     Abdominal infection (H):  Clinicially improved on exam and has had BM.  No nausea. Ambulating.  Bacteremia, +cx from OSH, awaiting sensitivities.   c diff neg  Appreciate input and care from ID.  Endometrial cancer:  No adjuvant therapy needed  ADAT  Pain, add Percocet prn and Ibuprofen prn  Poor IV access:  If another IV needs to be placed would strongly recommend PICC  Albertina Smith PA-C  GYN Onc  Pager until 5pm: [phone]  After 5pm, please call the answering service: [phone]                 Interval History:   doing well; no cp, sob, n/v/d, abdominal pain improved, but still requiring IV Dilaudid.  Slept overnight, less anxiety. Tolerating full liquids. Fever curve down.              Medications:   I have reviewed this patient's current medications               Physical Exam:   Blood pressure 104/56, pulse 66, temperature 99.2  F (37.3  C), temperature source Oral, resp. rate 16, height 1.676 m (5' 6\"), weight 101.5 kg (223 lb 12.8 oz), SpO2 90 %.       Intake/Output Summary (Last 24 hours) at 03/08/17 1038  Last data filed at 03/07/17 1315   Gross per 24 hour   Intake              660 ml   Output                0 ml   Net              660 ml       Lungs:   Unlabored        Abdomen:   BS more active, less distended, non-tender to palpation     Musculoskeletal:   no lower extremity edema present                Data:   All laboratory data reviewed  "

## 2017-03-08 NOTE — PLAN OF CARE
Problem: Goal Outcome Summary  Goal: Goal Outcome Summary  Outcome: No Change  VSS, tmax 97.9, A/O x4. Dilaudid for pain x3 as well as hot packs. Lap sites CDI with LRQ site scabbed. ABD area less tender to palpation, noted to be soft. No loose stoos(c-diff negative). Patient slept well this shift and had no acute anxious needs. Full liquid diet, SBA for transfers/ambulation. Plan: continue to monitor IV ABX and labs.

## 2017-03-08 NOTE — PROGRESS NOTES
[facility]    Hospitalist Progress Note    Assessment & Plan      Ms. Rosalia Schulz is a 51-year-old female with a medical history of cerebrovascular accident in 05/2016, iron deficiency anemia, depression, gastroesophageal reflux disease, migraines, obstructive sleep apnea and recent surgery for endometrial adenocarcinoma with total abdominal hysterectomy and bilateral salpingo-oophorectomy with lymphadenectomy who presented after having postop fever and abdominal pain    1. Sepsis due to likely intra-abdominal infection/Postop fever with intra-abdominal fluid collection:   -Patient presented with leukocytosis, high-grade fever, hypotension and suspected source of infection intra-abdominally  - She is approximately 1-1/2 weeks out from her surgery. (total abdominal hysterectomy and bilateral salpingo-oophorectomy with lymphadenectomy for endometrial adenocarcinoma with Dr Weldon on 2/23)  - CT abdomen/pelvis- Marked interval reduction in fluid since the comparison study. The fluid collection previously measured 10.3 x 7.2 cm, now measures 5.7 x 3.1 cm  - she had  fever upto 103.5.   - Initially received vancomycin and Zosyn  - Appreciate ID consult  - continue Zosyn   - Follow-up cultures from [facility]- positive for GNR; repeat BC here 3/7- NGTD  - Gynecology following  - Was seen by interventional radiology 3/6, with initial plan for intra-abdominal fluid aspiration however due to significant decrease in size of the fluid(within 24 hours) drainage of the fluid was not attempted  - follow up cultures  - low grade fever 99.2  - Plan at this time is to continue to treat with IV antibiotics and follow clinical course  - encourage ambulation    2. Mildly elevated liver function tests.   - transaminases done at [facility] was greater than 200s  - Bilirubin normal  - Etiology unclear, question due to infectious process  - Trending down; --164; --115--111  - Hold statin for now  -  "Recheck in the morning    3. History of cerebrovascular accident.   - No active issues.    - Resume aspirin once clear that intervention is not needed    4. History of iron deficiency anemia.   - Baseline is 10-12  - Slight drop from 10-8.9 overnight; probably dilutional, stable today at 8.9  - Monitor    5. Depression. Continue fluoxetine.     6. Gastroesophageal reflux disease. Continue omeprazole.     7. Migraines. Will have p.r.n. Imitrex available.     8. Obstructive sleep apnea.  CPAP with home settings.      D/W: RN  DVT Prophylaxis: Pneumatic Compression Devices  Code Status: Full Code    Disposition: Expected discharge pending clinical course     Interval History   Doing a little better, still reports abdominal pain, no N/V, tolerating diet; multiple family members present at the time of my exam; no BM today, states she does not feel passing gas but feels \"gas in her abdomen\"; discussed with bedside RN    -Data reviewed today: I reviewed all new labs and imaging results over the last 24 hours. I personally reviewed- CT abdomen/pelvis    Physical Exam   Temp: 99.2  F (37.3  C) Temp src: Oral BP: 104/56 Pulse: 66 Heart Rate: 59 Resp: 14 SpO2: 90 % O2 Device: None (Room air)    Vitals:    03/06/17 0230 03/08/17 0354   Weight: 100.3 kg (221 lb 1.9 oz) 101.5 kg (223 lb 12.8 oz)     Vital Signs with Ranges  Temp:  [96.9  F (36.1  C)-99.2  F (37.3  C)] 99.2  F (37.3  C)  Pulse:  [66-70] 66  Heart Rate:  [58-63] 59  Resp:  [14-16] 14  BP: (104-118)/(56-64) 104/56  SpO2:  [90 %-98 %] 90 %  I/O last 3 completed shifts:  In: 660 [I.V.:660]  Out: -     Constitutional: AA O X3, NAD  HEENT:Moist oral mucosa, no oral lesions, No pallor or icterus  Neck- Supple, Good ROM, No JVD  Respiratory:  No crackles, No wheezes, CTA B/L, Normal WOB  Cardiovascular: RRR, No murmur  GI: Soft, mild diffuse tender to palpation; BS faint, no rebound  Skin/Integument: Warm and dry, no rashes  MSK: No joint deformity or swelling, no " edema  Neuro: CN- grossly intact    Medications     dextrose 5% and 0.45% NaCl 50 mL/hr at 03/08/17 1033       FLUoxetine (PROzac) capsule 40 mg  40 mg Oral Daily     omeprazole (priLOSEC) CR capsule 20 mg  20 mg Oral QAM     piperacillin-tazobactam  3.375 g Intravenous Q6H     LORazepam  0.5 mg Intravenous Once       Data     Recent Labs  Lab 03/08/17  0710 03/07/17  1700 03/07/17  0745 03/06/17  1115 03/06/17  0455   WBC 6.6  --  9.2  --  10.3   HGB 8.9*  --  8.9*  --  10.0*   MCV 76*  --  75*  --  76*     --  158  --  161   INR  --   --   --  1.20*  --      --  136  --  139   POTASSIUM 3.5 3.5 3.3*  --  3.6   CHLORIDE 110*  --  104  --  109   CO2 27  --  23  --  23   BUN 8  --  8  --  13   CR 0.80  --  0.78  --  0.75   ANIONGAP 6  --  9  --  7   DULCE 8.8  --  8.5  --  8.3*   GLC 97  --  123*  --  123*   ALBUMIN 2.2*  --  2.4*  --  2.6*   PROTTOTAL 6.2*  --  6.1*  --  6.2*   BILITOTAL 0.5  --  0.6  --  0.6   ALKPHOS 168*  --  164*  --  186*   *  --  115*  --  177*   AST 65*  --  54*  --  119*       No results found for this or any previous visit (from the past 24 hour(s)).

## 2017-03-09 LAB
ALBUMIN SERPL-MCNC: 2.1 G/DL (ref 3.4–5)
ALP SERPL-CCNC: 247 U/L (ref 40–150)
ALT SERPL W P-5'-P-CCNC: 215 U/L (ref 0–50)
AMMONIA PLAS-SCNC: 29 UMOL/L (ref 10–50)
ANION GAP SERPL CALCULATED.3IONS-SCNC: 8 MMOL/L (ref 3–14)
AST SERPL W P-5'-P-CCNC: 247 U/L (ref 0–45)
BILIRUB DIRECT SERPL-MCNC: 0.2 MG/DL (ref 0–0.2)
BILIRUB SERPL-MCNC: 0.4 MG/DL (ref 0.2–1.3)
BUN SERPL-MCNC: 9 MG/DL (ref 7–30)
CALCIUM SERPL-MCNC: 8.6 MG/DL (ref 8.5–10.1)
CHLORIDE SERPL-SCNC: 109 MMOL/L (ref 94–109)
CO2 SERPL-SCNC: 25 MMOL/L (ref 20–32)
CREAT SERPL-MCNC: 0.72 MG/DL (ref 0.52–1.04)
ERYTHROCYTE [DISTWIDTH] IN BLOOD BY AUTOMATED COUNT: 21.8 % (ref 10–15)
GFR SERPL CREATININE-BSD FRML MDRD: 85 ML/MIN/1.7M2
GLUCOSE SERPL-MCNC: 101 MG/DL (ref 70–99)
HCT VFR BLD AUTO: 29.5 % (ref 35–47)
HGB BLD-MCNC: 9.1 G/DL (ref 11.7–15.7)
MCH RBC QN AUTO: 23.3 PG (ref 26.5–33)
MCHC RBC AUTO-ENTMCNC: 30.8 G/DL (ref 31.5–36.5)
MCV RBC AUTO: 76 FL (ref 78–100)
PLATELET # BLD AUTO: 195 10E9/L (ref 150–450)
POTASSIUM SERPL-SCNC: 3.4 MMOL/L (ref 3.4–5.3)
PROT SERPL-MCNC: 6.1 G/DL (ref 6.8–8.8)
RBC # BLD AUTO: 3.9 10E12/L (ref 3.8–5.2)
SODIUM SERPL-SCNC: 142 MMOL/L (ref 133–144)
WBC # BLD AUTO: 4.7 10E9/L (ref 4–11)

## 2017-03-09 PROCEDURE — S5010 5% DEXTROSE AND 0.45% SALINE: HCPCS | Performed by: INTERNAL MEDICINE

## 2017-03-09 PROCEDURE — 36415 COLL VENOUS BLD VENIPUNCTURE: CPT | Performed by: INTERNAL MEDICINE

## 2017-03-09 PROCEDURE — 82140 ASSAY OF AMMONIA: CPT | Performed by: INTERNAL MEDICINE

## 2017-03-09 PROCEDURE — 25000132 ZZH RX MED GY IP 250 OP 250 PS 637: Performed by: INTERNAL MEDICINE

## 2017-03-09 PROCEDURE — 25000132 ZZH RX MED GY IP 250 OP 250 PS 637: Performed by: PHYSICIAN ASSISTANT

## 2017-03-09 PROCEDURE — 82248 BILIRUBIN DIRECT: CPT | Performed by: INTERNAL MEDICINE

## 2017-03-09 PROCEDURE — 94660 CPAP INITIATION&MGMT: CPT

## 2017-03-09 PROCEDURE — 25000128 H RX IP 250 OP 636: Performed by: INTERNAL MEDICINE

## 2017-03-09 PROCEDURE — 40000275 ZZH STATISTIC RCP TIME EA 10 MIN

## 2017-03-09 PROCEDURE — 80053 COMPREHEN METABOLIC PANEL: CPT | Performed by: INTERNAL MEDICINE

## 2017-03-09 PROCEDURE — 99233 SBSQ HOSP IP/OBS HIGH 50: CPT | Performed by: INTERNAL MEDICINE

## 2017-03-09 PROCEDURE — 12000000 ZZH R&B MED SURG/OB

## 2017-03-09 PROCEDURE — 25800025 ZZH RX 258: Performed by: INTERNAL MEDICINE

## 2017-03-09 PROCEDURE — 85027 COMPLETE CBC AUTOMATED: CPT | Performed by: INTERNAL MEDICINE

## 2017-03-09 RX ORDER — ASPIRIN 81 MG/1
81 TABLET ORAL DAILY
Status: DISCONTINUED | OUTPATIENT
Start: 2017-03-09 | End: 2017-03-11 | Stop reason: HOSPADM

## 2017-03-09 RX ORDER — FLUCONAZOLE 150 MG/1
150 TABLET ORAL ONCE
Status: COMPLETED | OUTPATIENT
Start: 2017-03-09 | End: 2017-03-09

## 2017-03-09 RX ADMIN — OXYCODONE HYDROCHLORIDE AND ACETAMINOPHEN 1 TABLET: 5; 325 TABLET ORAL at 22:33

## 2017-03-09 RX ADMIN — PIPERACILLIN SODIUM,TAZOBACTAM SODIUM 3.38 G: 3; .375 INJECTION, POWDER, FOR SOLUTION INTRAVENOUS at 00:15

## 2017-03-09 RX ADMIN — OXYCODONE HYDROCHLORIDE AND ACETAMINOPHEN 2 TABLET: 5; 325 TABLET ORAL at 05:47

## 2017-03-09 RX ADMIN — IBUPROFEN 600 MG: 600 TABLET ORAL at 11:09

## 2017-03-09 RX ADMIN — AMOXICILLIN AND CLAVULANATE POTASSIUM 1 TABLET: 875; 125 TABLET, FILM COATED ORAL at 20:02

## 2017-03-09 RX ADMIN — DEXTROSE AND SODIUM CHLORIDE: 5; 450 INJECTION, SOLUTION INTRAVENOUS at 11:10

## 2017-03-09 RX ADMIN — PIPERACILLIN SODIUM,TAZOBACTAM SODIUM 3.38 G: 3; .375 INJECTION, POWDER, FOR SOLUTION INTRAVENOUS at 05:47

## 2017-03-09 RX ADMIN — FLUCONAZOLE 150 MG: 150 TABLET ORAL at 18:16

## 2017-03-09 RX ADMIN — FLUOXETINE HYDROCHLORIDE 40 MG: 20 CAPSULE ORAL at 08:32

## 2017-03-09 RX ADMIN — PIPERACILLIN SODIUM,TAZOBACTAM SODIUM 3.38 G: 3; .375 INJECTION, POWDER, FOR SOLUTION INTRAVENOUS at 11:50

## 2017-03-09 RX ADMIN — OMEPRAZOLE 20 MG: 20 CAPSULE, DELAYED RELEASE ORAL at 08:32

## 2017-03-09 RX ADMIN — OXYCODONE HYDROCHLORIDE AND ACETAMINOPHEN 2 TABLET: 5; 325 TABLET ORAL at 01:42

## 2017-03-09 RX ADMIN — ACETAMINOPHEN 650 MG: 325 TABLET, FILM COATED ORAL at 16:58

## 2017-03-09 RX ADMIN — ASPIRIN 81 MG: 81 TABLET, COATED ORAL at 18:16

## 2017-03-09 ASSESSMENT — PAIN DESCRIPTION - DESCRIPTORS: DESCRIPTORS: ACHING

## 2017-03-09 NOTE — PROGRESS NOTES
[facility]    Hospitalist Progress Note    Assessment & Plan      Ms. Rosalia Schulz is a 51-year-old female with a medical history of cerebrovascular accident in 05/2016, iron deficiency anemia, depression, gastroesophageal reflux disease, migraines, obstructive sleep apnea and recent surgery for endometrial adenocarcinoma with total abdominal hysterectomy and bilateral salpingo-oophorectomy with lymphadenectomy who presented after having postop fever and abdominal pain    1. Sepsis due to likely intra-abdominal infection/Postop fever with intra-abdominal fluid collection:   -Patient presented with leukocytosis, high-grade fever, hypotension and suspected source of infection intra-abdominally  - She is approximately 1-1/2 weeks out from her surgery. (total abdominal hysterectomy and bilateral salpingo-oophorectomy with lymphadenectomy for endometrial adenocarcinoma with Dr Weldon on 2/23)  - CT abdomen/pelvis- Marked interval reduction in fluid since the comparison study. The fluid collection previously measured 10.3 x 7.2 cm, now measures 5.7 x 3.1 cm  - she had  fever up to 103.5.   - Initially received vancomycin and Zosyn, then continued on Zosyn only  - Appreciate ID consult  - Follow-up cultures from [facility]- positive for GNR in anaerobic bottle  - repeat BC here 3/7- NGTD  - Gynecology following  - Was seen by interventional radiology 3/6, with initial plan for intra-abdominal fluid aspiration however due to significant decrease in size of the fluid(within 24 hours) drainage of the fluid was not attempted  - follow up cultures  - no leucocytosis  - low grade fever 99.2 yesterday evening, afebrile today  - ID rec to switch ABX to po Augmentin for 2 weeks  - encourage ambulation  - follow up with Dr Weldon within 10 days    2. Mildly elevated liver function tests.   - transaminases done at [facility] was greater than 200s  - Bilirubin normal  - Etiology unclear, question due to  infectious process  - Trending down initially- now up ; --115--111--215 ; --54--65--247  - Hold statin for now  - check US abdomen  - Recheck LFTs in am; if continues to trend up- GI consult    3. Sleepiness  - unclear cause- she thinks it is because melatonin she got last night  - she also got Percocet 2 pills X 2 at 1 am and 6am  -  states she tolerated Percocet in the past with no side effects  - not sure if medication related vs others  - afebrile, overall states she is feeling better  - d/c Melatonin  - told her that Percocet might cause sleepiness but she wants to keep it ordered for now but she will try to take only 1 tab at a time instead of 2 tab; discussed with RN  - with elevated LFTs- will check ammonia level    4. History of cerebrovascular accident.   - No active issues.    - Resume aspirin     5. History of iron deficiency anemia.   - Baseline is 10-12  - Slight drop from 10-8.9 overnight; probably dilutional, stable today at 9.1  - Monitor    6. Depression. Continue fluoxetine.     7. Gastroesophageal reflux disease. Continue omeprazole.     8. Migraines. Will have p.r.n. Imitrex available.     9. Obstructive sleep apnea.  CPAP with home settings.        D/W: RN  DVT Prophylaxis: Pneumatic Compression Devices  Code Status: Full Code    Disposition: Expected discharge in 1-2 days    Interval History    Very sleepy during the day, she thinks its because of Melatonin; otherwise- she states she feels better, appetite ok, passing gas, no BM today; asking for Diflucan as she thinks she is getting vaginal candidiasis with ABX; discussed with bedside RN and with     -Data reviewed today: I reviewed all new labs and imaging results over the last 24 hours. I personally reviewed- no imaging or EKG today    Physical Exam   Temp: 97.2  F (36.2  C) Temp src: Axillary BP: 111/67   Heart Rate: 57 Resp: 16 SpO2: 97 % O2 Device: BiPAP/CPAP    Vitals:    03/06/17 0230 03/08/17 0354 03/09/17  0546   Weight: 100.3 kg (221 lb 1.9 oz) 101.5 kg (223 lb 12.8 oz) 102 kg (224 lb 13.9 oz)     Vital Signs with Ranges  Temp:  [97.2  F (36.2  C)-99.2  F (37.3  C)] 97.2  F (36.2  C)  Heart Rate:  [57-74] 57  Resp:  [16] 16  BP: (111-116)/(60-67) 111/67  SpO2:  [93 %-97 %] 97 %  I/O last 3 completed shifts:  In: 671 [P.O.:180; I.V.:491]  Out: -     Constitutional: AA O X3, NAD, sleepy  HEENT:Moist oral mucosa, no oral lesions, No pallor or icterus  Neck- Supple, Good ROM, No JVD  Respiratory:  No crackles, No wheezes, CTA B/L, Normal WOB  Cardiovascular: RRR, No murmur  GI: Soft, mild diffuse tender to palpation; BS present, no rebound  Skin/Integument: Warm and dry, no rashes  MSK: No joint deformity or swelling, no edema  Neuro: CN- grossly intact    Medications     dextrose 5% and 0.45% NaCl 50 mL/hr at 03/09/17 1110       amoxicillin-clavulanate  1 tablet Oral Q12H NANDO     FLUoxetine (PROzac) capsule 40 mg  40 mg Oral Daily     omeprazole (priLOSEC) CR capsule 20 mg  20 mg Oral QAM     LORazepam  0.5 mg Intravenous Once       Data     Recent Labs  Lab 03/09/17  0729 03/08/17  0710 03/07/17  1700 03/07/17  0745 03/06/17  1115   WBC 4.7 6.6  --  9.2  --    HGB 9.1* 8.9*  --  8.9*  --    MCV 76* 76*  --  75*  --     163  --  158  --    INR  --   --   --   --  1.20*    143  --  136  --    POTASSIUM 3.4 3.5 3.5 3.3*  --    CHLORIDE 109 110*  --  104  --    CO2 25 27  --  23  --    BUN 9 8  --  8  --    CR 0.72 0.80  --  0.78  --    ANIONGAP 8 6  --  9  --    DULCE 8.6 8.8  --  8.5  --    * 97  --  123*  --    ALBUMIN 2.1* 2.2*  --  2.4*  --    PROTTOTAL 6.1* 6.2*  --  6.1*  --    BILITOTAL 0.4 0.5  --  0.6  --    ALKPHOS 247* 168*  --  164*  --    * 111*  --  115*  --    * 65*  --  54*  --        No results found for this or any previous visit (from the past 24 hour(s)).

## 2017-03-09 NOTE — PLAN OF CARE
Problem: Goal Outcome Summary  Goal: Goal Outcome Summary  Outcome: No Change  Patient A&O. VSS. C/o generalized pain. PRN percocet given x1 with relief. CPAP at HS. SBA. Regular diet. On IV abx. Fluids @ 50ml/hr. ID following. Will continue to monitor.

## 2017-03-09 NOTE — PLAN OF CARE
Problem: Goal Outcome Summary  Goal: Goal Outcome Summary  Outcome: No Change  A/O. VSS. Uses cpap at night, on RA during the day. Pt c/o of mild abdominal pain this morning, gave prn ibuprofen, effective. SBA, ambulated in hallway x1 this shift. Regular diet. IVF infusing. ID consulted w/ pt today, see note. Continue to monitor.

## 2017-03-09 NOTE — PROGRESS NOTES
HOSPITALIST CROSS COVER NOTE.    Called regarding insomnia.    Patient would like melatonin.    Melatonin 3 mg at bedtime ordered.    ROXANNA WALSH MD

## 2017-03-09 NOTE — PROGRESS NOTES
[facility]    Infectious Disease Progress Note    Date of Service (when I saw the patient): 03/09/2017     Assessment & Plan   Rosalia Schulz is a 51 year old female who was admitted on 3/6/2017.     Impression:   51 y.o with h/o endometrial adenocarcinoma with total abdominal hysterectomy and bilateral salpingo-oophorectomy with lymphadenectomy done on 2/23.   Admitted this occasion with fever, abdominal pain, leucocytosis and CT abdomen with fluid collection concerning for abscess.   Seen by Gyn no plans for any drainage given decrease in the size of fluid collection on the repeat CT scan.   On zosyn.   Cultures from [facility] positive for Gram positive rods in the anaerobic cultures, 1/4 culture only.  Follow up cultures here are NGTD.   Those cultures have been sent to [facility] for identification phone number: [phone], patient`s account number is [id number]. Called there this after noon, and results wont be back before Saturday.      Recommendations:   Switching to Augmentin 875 mg BID. Stopping zosyn.   Follow temp curve, symptoms.         Ivonne Mckay MD    Interval History   T max 99.2  Sleepy after melatonin she received last night.     Physical Exam   Temp: 97.2  F (36.2  C) Temp src: Axillary BP: 111/67   Heart Rate: 57 Resp: 16 SpO2: 97 % O2 Device: BiPAP/CPAP    Vitals:    03/06/17 0230 03/08/17 0354 03/09/17 0546   Weight: 100.3 kg (221 lb 1.9 oz) 101.5 kg (223 lb 12.8 oz) 102 kg (224 lb 13.9 oz)     Vital Signs with Ranges  Temp:  [97.2  F (36.2  C)-99.2  F (37.3  C)] 97.2  F (36.2  C)  Heart Rate:  [57-74] 57  Resp:  [14-16] 16  BP: (103-116)/(52-67) 111/67  SpO2:  [92 %-97 %] 97 %    Constitutional: Awake, alert, cooperative, no apparent distress  Lungs: Clear to auscultation bilaterally, no crackles or wheezing  Cardiovascular: Regular rate and rhythm, normal S1 and S2, and no murmur noted  Abdomen: Normal bowel sounds, soft, non-distended, non-tender  Skin: No rashes, no  cyanosis, no edema  Other:    Medications     dextrose 5% and 0.45% NaCl 50 mL/hr at 03/08/17 1033       FLUoxetine (PROzac) capsule 40 mg  40 mg Oral Daily     omeprazole (priLOSEC) CR capsule 20 mg  20 mg Oral QAM     piperacillin-tazobactam  3.375 g Intravenous Q6H     LORazepam  0.5 mg Intravenous Once       Data   All microbiology laboratory data reviewed.  Recent Labs   Lab Test  03/09/17   0729 03/08/17   0710  03/07/17   0745   WBC  4.7  6.6  9.2   HGB  9.1*  8.9*  8.9*   HCT  29.5*  28.6*  28.5*   MCV  76*  76*  75*   PLT  195  163  158     Recent Labs   Lab Test  03/09/17   0729 03/08/17   0710  03/07/17   0745   CR  0.72  0.80  0.78     No lab results found.  Recent Labs   Lab Test  03/07/17   0755   CULT  No growth after 1 day

## 2017-03-09 NOTE — PROGRESS NOTES
"[facility]  Hospitalist Progress Note          Assessment and Plan:     Abdominal infection (H):  Clinicially improving.    Bacteremia, +cx from OSH (GNR), awaiting sensitivities.   c diff neg  Appreciate input and care from ID, on Zosyn. Repeat BC NTD  Endometrial cancer:  No adjuvant therapy needed  RALPH  Pain, Percocet prn and Ibuprofen prn  Poor IV access:  If another IV needs to be placed would strongly recommend PICC  Dispo: Pending recs from ID for outpt regimen    Albertina Smith PA-C  GYN Onc  Pager until 5pm: [phone]  After 5pm, please call the answering service: [phone]    Patient seen and examined.  Awaiting change to oral antibiotics by ID.  To complete 14 day total course of antibiotics.  Patient requests diflucan after antibiotics since h/o yeast infections after antibiotics previously.  Will need f/u in my office within 10 days of discharge.                 Interval History:   doing well; no cp, sob, n/v/d, abdominal pain improved, but still requiring Percocet.  Slept overnight, less anxiety. Tolerating diet. Fever curve down. Just woke up.              Medications:   I have reviewed this patient's current medications               Physical Exam:   Blood pressure 111/67, pulse 66, temperature 97.2  F (36.2  C), temperature source Axillary, resp. rate 16, height 1.676 m (5' 6\"), weight 102 kg (224 lb 13.9 oz), SpO2 97 %.      Intake/Output Summary (Last 24 hours) at 03/09/17 0846  Last data filed at 03/08/17 2223   Gross per 24 hour   Intake              833 ml   Output                0 ml   Net              833 ml       Lungs:   Unlabored        Abdomen:   Soft, non-tender to palpation, lap sites c/d/i, RLQ scab noted     Musculoskeletal:   no lower extremity edema present                Data:   All laboratory data reviewed  "

## 2017-03-09 NOTE — PLAN OF CARE
Problem: Goal Outcome Summary  Goal: Goal Outcome Summary  Outcome: Improving  Pt ambulated x2 in halls. Percocet given for generalized back/hip/shoulder pain. Tmax 99.2F. Tolerating diet. Continue abx and pain control.

## 2017-03-10 ENCOUNTER — APPOINTMENT (OUTPATIENT)
Dept: ULTRASOUND IMAGING | Facility: CLINIC | Age: 52
DRG: 862 | End: 2017-03-10
Attending: INTERNAL MEDICINE
Payer: COMMERCIAL

## 2017-03-10 LAB
ALBUMIN SERPL-MCNC: 2.1 G/DL (ref 3.4–5)
ALP SERPL-CCNC: 268 U/L (ref 40–150)
ALT SERPL W P-5'-P-CCNC: 222 U/L (ref 0–50)
ANION GAP SERPL CALCULATED.3IONS-SCNC: 8 MMOL/L (ref 3–14)
AST SERPL W P-5'-P-CCNC: 211 U/L (ref 0–45)
BILIRUB DIRECT SERPL-MCNC: <0.1 MG/DL (ref 0–0.2)
BILIRUB SERPL-MCNC: 0.2 MG/DL (ref 0.2–1.3)
BUN SERPL-MCNC: 10 MG/DL (ref 7–30)
CALCIUM SERPL-MCNC: 8.6 MG/DL (ref 8.5–10.1)
CHLORIDE SERPL-SCNC: 109 MMOL/L (ref 94–109)
CO2 SERPL-SCNC: 26 MMOL/L (ref 20–32)
CREAT SERPL-MCNC: 0.72 MG/DL (ref 0.52–1.04)
ERYTHROCYTE [DISTWIDTH] IN BLOOD BY AUTOMATED COUNT: 21.8 % (ref 10–15)
FERRITIN SERPL-MCNC: 379 NG/ML (ref 8–252)
GFR SERPL CREATININE-BSD FRML MDRD: 85 ML/MIN/1.7M2
GLUCOSE SERPL-MCNC: 101 MG/DL (ref 70–99)
HBV CORE AB SERPL QL IA: NONREACTIVE
HBV SURFACE AG SERPL QL IA: NONREACTIVE
HCT VFR BLD AUTO: 28.9 % (ref 35–47)
HCV AB SERPL QL IA: NORMAL
HGB BLD-MCNC: 9 G/DL (ref 11.7–15.7)
IGA SERPL-MCNC: 136 MG/DL (ref 70–380)
IGG SERPL-MCNC: 700 MG/DL (ref 695–1620)
IGM SERPL-MCNC: 152 MG/DL (ref 60–265)
INR PPP: 1.04 (ref 0.86–1.14)
IRON SATN MFR SERPL: 23 % (ref 15–46)
IRON SERPL-MCNC: 50 UG/DL (ref 35–180)
MCH RBC QN AUTO: 23.6 PG (ref 26.5–33)
MCHC RBC AUTO-ENTMCNC: 31.1 G/DL (ref 31.5–36.5)
MCV RBC AUTO: 76 FL (ref 78–100)
PLATELET # BLD AUTO: 200 10E9/L (ref 150–450)
POTASSIUM SERPL-SCNC: 3.9 MMOL/L (ref 3.4–5.3)
PROT SERPL-MCNC: 5.9 G/DL (ref 6.8–8.8)
RBC # BLD AUTO: 3.81 10E12/L (ref 3.8–5.2)
SODIUM SERPL-SCNC: 143 MMOL/L (ref 133–144)
TIBC SERPL-MCNC: 221 UG/DL (ref 240–430)
WBC # BLD AUTO: 5.1 10E9/L (ref 4–11)

## 2017-03-10 PROCEDURE — 80053 COMPREHEN METABOLIC PANEL: CPT | Performed by: INTERNAL MEDICINE

## 2017-03-10 PROCEDURE — 12000000 ZZH R&B MED SURG/OB

## 2017-03-10 PROCEDURE — 76705 ECHO EXAM OF ABDOMEN: CPT

## 2017-03-10 PROCEDURE — 25000132 ZZH RX MED GY IP 250 OP 250 PS 637: Performed by: INTERNAL MEDICINE

## 2017-03-10 PROCEDURE — 86706 HEP B SURFACE ANTIBODY: CPT | Performed by: PHYSICIAN ASSISTANT

## 2017-03-10 PROCEDURE — 86038 ANTINUCLEAR ANTIBODIES: CPT | Performed by: PHYSICIAN ASSISTANT

## 2017-03-10 PROCEDURE — 83540 ASSAY OF IRON: CPT | Performed by: PHYSICIAN ASSISTANT

## 2017-03-10 PROCEDURE — S5010 5% DEXTROSE AND 0.45% SALINE: HCPCS | Performed by: INTERNAL MEDICINE

## 2017-03-10 PROCEDURE — 83550 IRON BINDING TEST: CPT | Performed by: PHYSICIAN ASSISTANT

## 2017-03-10 PROCEDURE — 36415 COLL VENOUS BLD VENIPUNCTURE: CPT | Performed by: PHYSICIAN ASSISTANT

## 2017-03-10 PROCEDURE — 82248 BILIRUBIN DIRECT: CPT | Performed by: INTERNAL MEDICINE

## 2017-03-10 PROCEDURE — 87340 HEPATITIS B SURFACE AG IA: CPT | Performed by: PHYSICIAN ASSISTANT

## 2017-03-10 PROCEDURE — 25800025 ZZH RX 258: Performed by: INTERNAL MEDICINE

## 2017-03-10 PROCEDURE — 94660 CPAP INITIATION&MGMT: CPT

## 2017-03-10 PROCEDURE — 86803 HEPATITIS C AB TEST: CPT | Performed by: PHYSICIAN ASSISTANT

## 2017-03-10 PROCEDURE — 85027 COMPLETE CBC AUTOMATED: CPT | Performed by: INTERNAL MEDICINE

## 2017-03-10 PROCEDURE — 25000132 ZZH RX MED GY IP 250 OP 250 PS 637: Performed by: PHYSICIAN ASSISTANT

## 2017-03-10 PROCEDURE — 82728 ASSAY OF FERRITIN: CPT | Performed by: PHYSICIAN ASSISTANT

## 2017-03-10 PROCEDURE — 36415 COLL VENOUS BLD VENIPUNCTURE: CPT | Performed by: INTERNAL MEDICINE

## 2017-03-10 PROCEDURE — 99207 ZZC CDG-MDM COMPONENT: MEETS HIGH - UP CODED: CPT | Performed by: INTERNAL MEDICINE

## 2017-03-10 PROCEDURE — 82784 ASSAY IGA/IGD/IGG/IGM EACH: CPT | Performed by: PHYSICIAN ASSISTANT

## 2017-03-10 PROCEDURE — 99233 SBSQ HOSP IP/OBS HIGH 50: CPT | Performed by: INTERNAL MEDICINE

## 2017-03-10 PROCEDURE — 86708 HEPATITIS A ANTIBODY: CPT | Performed by: PHYSICIAN ASSISTANT

## 2017-03-10 PROCEDURE — 83516 IMMUNOASSAY NONANTIBODY: CPT | Performed by: PHYSICIAN ASSISTANT

## 2017-03-10 PROCEDURE — 40000275 ZZH STATISTIC RCP TIME EA 10 MIN

## 2017-03-10 PROCEDURE — 87799 DETECT AGENT NOS DNA QUANT: CPT | Performed by: PHYSICIAN ASSISTANT

## 2017-03-10 PROCEDURE — 85610 PROTHROMBIN TIME: CPT | Performed by: PHYSICIAN ASSISTANT

## 2017-03-10 PROCEDURE — 86704 HEP B CORE ANTIBODY TOTAL: CPT | Performed by: PHYSICIAN ASSISTANT

## 2017-03-10 RX ORDER — LIDOCAINE 40 MG/G
CREAM TOPICAL
Status: DISCONTINUED | OUTPATIENT
Start: 2017-03-10 | End: 2017-03-11 | Stop reason: HOSPADM

## 2017-03-10 RX ORDER — METRONIDAZOLE 500 MG/1
500 TABLET ORAL EVERY 8 HOURS SCHEDULED
Status: DISCONTINUED | OUTPATIENT
Start: 2017-03-10 | End: 2017-03-11 | Stop reason: HOSPADM

## 2017-03-10 RX ORDER — LEVOFLOXACIN 500 MG/1
500 TABLET, FILM COATED ORAL DAILY
Status: DISCONTINUED | OUTPATIENT
Start: 2017-03-10 | End: 2017-03-11 | Stop reason: HOSPADM

## 2017-03-10 RX ADMIN — IBUPROFEN 600 MG: 600 TABLET ORAL at 22:06

## 2017-03-10 RX ADMIN — METRONIDAZOLE 500 MG: 500 TABLET ORAL at 22:06

## 2017-03-10 RX ADMIN — ASPIRIN 81 MG: 81 TABLET, COATED ORAL at 09:26

## 2017-03-10 RX ADMIN — IBUPROFEN 600 MG: 600 TABLET ORAL at 15:56

## 2017-03-10 RX ADMIN — IBUPROFEN 600 MG: 600 TABLET ORAL at 02:19

## 2017-03-10 RX ADMIN — IBUPROFEN 600 MG: 600 TABLET ORAL at 09:26

## 2017-03-10 RX ADMIN — LEVOFLOXACIN 500 MG: 500 TABLET, FILM COATED ORAL at 10:19

## 2017-03-10 RX ADMIN — DEXTROSE AND SODIUM CHLORIDE: 5; 450 INJECTION, SOLUTION INTRAVENOUS at 09:46

## 2017-03-10 RX ADMIN — METRONIDAZOLE 500 MG: 500 TABLET ORAL at 14:08

## 2017-03-10 RX ADMIN — OMEPRAZOLE 20 MG: 20 CAPSULE, DELAYED RELEASE ORAL at 09:26

## 2017-03-10 RX ADMIN — FLUOXETINE HYDROCHLORIDE 40 MG: 20 CAPSULE ORAL at 09:26

## 2017-03-10 ASSESSMENT — PAIN DESCRIPTION - DESCRIPTORS
DESCRIPTORS: ACHING
DESCRIPTORS: ACHING

## 2017-03-10 NOTE — CONSULTS
[facility]  Gastroenterology Consultation    Rosalia Schulz  [address]  [facility] [address]  51 year old female    Admission Date/Time: 3/6/2017  Primary Care Provider: Joby Hammonds    We were asked to see the patient in consultation by Dr. Dave for evaluation of elevated LFTs.        HPI:  Rosalia Schulz is a 51 year old female who has a PMH significant for CVAin 5/2016, ALVAREZ, depression, GERD, migraines, KARTHIK and recent surgery for endometrial adenocarcinoma with TAHBSO on 2/23.      She re-presented on 3/6 with fevers and chills.  CT scan revealed a fluid collection without definitive abscess.  She had leukocytosis, fever and hypotension.  Antibiotics have been given and she is improving.      We have been consulted due to elevated LFTs.  These were increased when she was admitted on 3/6.  I do not have any values prior to her surgery.  They have gradually been increasing since admission.  Her total bilirubin is normal however.      I note the patient was given one dosage of Cefazolin on 2/23 during her surgery.  She is now on Augmentin but has only had one dosage.  She denies taking any supplements aside from a multivitamin.  No herbal remedies.  She has never had liver issues in the past.  She has never had jaundice.  She currently has minimal abdominal pain.  No CP or SOB.      There is no family history of liver disease.      ROS: A comprehensive ten point review of systems was negative aside from those in mentioned in the HPI.      MEDICATIONS:   No current facility-administered medications on file prior to encounter.   Current Outpatient Prescriptions on File Prior to Encounter:  multivitamin, therapeutic with minerals (MULTI-VITAMIN) TABS tablet Take 1 tablet by mouth daily   ibuprofen (ADVIL/MOTRIN) 600 MG tablet Take 1 tablet (600 mg) by mouth every 6 hours as needed for pain (mild)   FLUoxetine HCl (PROZAC PO) Take 40 mg by mouth daily   SUMAtriptan Succinate (IMITREX PO)  "Take 50 mg by mouth May repeat after two hours.  Maximum dose 200 mg/24 hours   ASPIRIN PO Take 81 mg by mouth daily   OMEPRAZOLE PO Take 20 mg by mouth every morning   ATORVASTATIN CALCIUM PO Take 10 mg by mouth daily       ALLERGIES: No Known Allergies    Past Medical History   Diagnosis Date     Anemia      CVA (cerebral vascular accident) (H) left thalmus 2016     Gastroesophageal reflux disease      Major depression in partial remission (H)      Migraine      Obesity      KARTHIK (obstructive sleep apnea)        Past Surgical History   Procedure Laterality Date     Abdomen surgery        section     Orthopedic surgery       ganglion cyst     Cyst removal scalp       Gyn surgery       ablation     Davinci hysterectomy total, bilateral salpingo-oophorectmy, node dissection, tumor staging, combined Bilateral 2017     Procedure: COMBINED DAVINCI XI HYSTERECTOMY TOTAL, SALPINGO-OOPHORECTOMY, NODE DISSECTION, TUMOR STAGING;  Surgeon: Tiffanie Weldon MD;  Location: [facility] OR     Davinci lymphadenectomy Bilateral 2017     Procedure: DAVINCI LYMPHADENECTOMY;  Surgeon: Tiffanie Weldon MD;  Location:  OR         SOCIAL HISTORY:  Social History   Substance Use Topics     Smoking status: Never Smoker     Smokeless tobacco: Not on file     Alcohol use Yes      Comment: 1- 2per week       FAMILY HISTORY:  No family history on file.    PHYSICAL EXAM:   /73 (BP Location: Left arm)  Pulse 66  Temp 97.4  F (36.3  C) (Oral)  Resp 16  Ht 1.676 m (5' 6\")  Wt 101.9 kg (224 lb 10.4 oz)  SpO2 96%  BMI 36.26 kg/m2    Constitutional: NAD, comfortable  Cardiovascular: RRR, normal S1 and S2, no r/c/g/m  Respiratory: CTAB  Psychiatric: mentation appears normal and affect normal  Head: Normocephalic. Atraumatic.    Neck: Neck supple. No adenopathy. Thyroid symmetric, normal size, trachea midline  Eyes:  PERRL, no icterus  ENT: Hearing adequate, pharynx normal without erythema or exudate  Abdomen: "   Auscultation: +BS  Appearance: normal  Palpation: soft, nontender, nondistended  NEURO: grossly negative  SKIN: no suspicious lesions or rashes  LYMPH:   anterior cervical: no adenopathy  posterior cervical: no adenopathy  supraclavicular: no adenopathy          ADDITIONAL COMMENTS:   I reviewed the patient's new clinical lab test results.   Recent Labs   Lab Test  03/10/17   0710  03/09/17   0729 03/08/17 0710 03/06/17   1115   WBC  5.1  4.7  6.6   < >   --    HGB  9.0*  9.1*  8.9*   < >   --    MCV  76*  76*  76*   < >   --    PLT  200  195  163   < >   --    INR   --    --    --    --   1.20*    < > = values in this interval not displayed.     Recent Labs   Lab Test  03/10/17   0710  03/09/17   0729  03/08/17   0710   NA  143  142  143   POTASSIUM  3.9  3.4  3.5   CHLORIDE  109  109  110*   CO2  26  25  27   BUN  10  9  8   CR  0.72  0.72  0.80   ANIONGAP  8  8  6   DULCE  8.6  8.6  8.8   GLC  101*  101*  97     Recent Labs   Lab Test  03/10/17   0710  03/09/17   0729 03/08/17   0710   ALBUMIN  2.1*  2.1*  2.2*   BILITOTAL  0.2  0.4  0.5   ALT  222*  215*  111*   AST  211*  247*  65*   ALKPHOS  268*  247*  168*             .    CONSULTATION ASSESSMENT AND PLAN:    Active Problems:    Abdominal infection (H)  Elevated LFTs    Assessment: 51 year old female with PMH as per HPI, admitted due to fevers and found to have an intraabdominal infection.  LFTs have been elevated since admission but gradually worsening.  I do not have LFTs prior to the patient's surgery.  She did receive one dosage of Ancef on 2/23, which is approximately the fifth highest cause of drug induced liver injury.  Also, this could be related to sepsis, however the labs would be an atypical pattern for this given the normal bilirubin.  Ischemic hepatopathy could also be considered but she does not have the typical pattern of elevation that one would expect with that diagnosis.      Plan:   -Check hepatitis serologies, autoimmune markers,  "iron studies  -I recommend finding an alternative to Augmentin as this is the number one cause of drug induced liver injury and if this is truly a drug reaction, I would work to eliminate all agents that could worsen the hepatitis.  Additionally would avoid \"cycline\" antibiotics and also Macrobid  -LFTs daily  -Recheck INR  -Agree with US        I will discuss the patient's findings and plan with Dr. Cabrera who will independently examine the patient and add further recommendations as necessary.          Chiara Soto, PAC  [facility]  Office:  [phone] call if needed after 5PM  Cell:  [phone], not available after 5PM at this number    "

## 2017-03-10 NOTE — PLAN OF CARE
Problem: Goal Outcome Summary  Goal: Goal Outcome Summary  Outcome: No Change  Pt is alert and oriented X4.  VSS.  Pt complained of 4/10 abdominal pain tylenol given X1 and percocet get X1 (2230).  Pt assist of 1 with ambulation.  Regular diet, NPO at midnight for ultrasound in the AM.  Pt transitioned to oral antibiotics this evening.  IVF running at 50ml/hr.  Pt showered this evening.  Hot pack applied to abdomen with some pain relief.  Discharge pending ultrasound results and ID consult.  Will continue to monitor.

## 2017-03-10 NOTE — PROGRESS NOTES
MD Notification    Notified Person:  MD    Notified Persons Name: Dr. Post    Notification Date/Time: 3/10/2017 at 1215    Notification Interaction:  Talked with Physician    Purpose of Notification: positive blood cultures from [place] faxed.  Now growing gram positive cocci in clusters.     Orders Received: None    Comments: No changes.  PO Levaquin and flagyl adequate.  Still ok to discharge from ID standpoint.

## 2017-03-10 NOTE — PROGRESS NOTES
[facility]    Hospitalist Progress Note    Assessment & Plan      Ms. Rosalia Schulz is a 51-year-old female with a medical history of cerebrovascular accident in 05/2016, iron deficiency anemia, depression, gastroesophageal reflux disease, migraines, obstructive sleep apnea and recent surgery for endometrial adenocarcinoma with total abdominal hysterectomy and bilateral salpingo-oophorectomy with lymphadenectomy who presented after having postop fever and abdominal pain    1. Sepsis due to likely intra-abdominal infection/Postop fever with intra-abdominal fluid collection:   -Patient presented with leukocytosis, high-grade fever, hypotension and suspected source of infection intra-abdominally  - She is approximately 1-1/2 weeks out from her surgery. (total abdominal hysterectomy and bilateral salpingo-oophorectomy with lymphadenectomy for endometrial adenocarcinoma with Dr Weldon on 2/23)  - CT abdomen/pelvis- Marked interval reduction in fluid since the comparison study. The fluid collection previously measured 10.3 x 7.2 cm, now measures 5.7 x 3.1 cm (possible spontaneous vaginal drainage?)  - she had  fever up to 103.5.   - Initially received vancomycin and Zosyn, then continued on Zosyn only  - Appreciate ID consult  - Follow-up cultures from [facility]- positive for GNR in anaerobic bottle  - repeat BC here 3/7- NGTD  - Gynecology following  - Was seen by interventional radiology 3/6, with initial plan for intra-abdominal fluid aspiration however due to significant decrease in size of the fluid(within 24 hours) drainage of the fluid was not attempted  - follow up cultures  - no leucocytosis  - afebrile today  - ID rec to switch ABX to po Augmentin for 2 weeks but now with worsening LFTs-- ABX switched to po Levaquin and po Flagyl for a total of 14 days  - encourage ambulation  - follow up with Dr Weldon within 10 days    2. Endometrial adenocarcinoma  - s/p recent total abdominal hysterectomy  "and bilateral salpingo-oophorectomy with lymphadenectomy   - GYN following  - f/u with Dr Weldon in 10 days as outpatient    3. Mildly elevated liver function tests.   - transaminases done at [facility] was greater than 200s  - Bilirubin normal; INR 1.04  - Trending down initially- now up ; --115--111--215--222 ; --54--65--247--211; Alk phosph also trending up  - PTA statin on hold  - US abdomen- coarse echogenicity but no focal lesions, nonspecific  - GI consult appreciated  - Etiology? DILI vs sepsis  - GI rec to change Augmentin to a different ABX (switched to po Levaquin/Flagyl today)  - viral serologies, autoimmune panel pending  - Recheck LFTs in am  - may follow up with GI clinic or PMD      4. Sleepiness- resolved  - on 3/9- noted to be very sleepy  - unclear cause- she thinks it is because melatonin she got last night  - she also got Percocet 2 pills X 2 at 1 am and 6am  -  states she tolerated Percocet in the past with no side effects  - ammonia level was ok  - d/sharan Melatonin  - told her that Percocet might cause sleepiness but she wants to keep it ordered for now but she will try to take only 1 tab at a time instead of 2 tab; discussed with RN  - today- back to baseline    5. History of cerebrovascular accident.   - No active issues.    - Resume aspirin     6. History of iron deficiency anemia.   - Baseline Hb 10-12  - Slight drop from 10-8.9 overnight; probably dilutional, stable today at 9.1--9  - iron studies    7. Depression. Continue fluoxetine.     8. GERD  - on PTA omeprazole.     9. Migraines. Will have p.r.n. Imitrex available.     10. KARTHIK  - CPAP with home settings.    DVT Prophylaxis: Pneumatic Compression Devices  Code Status: Full Code    Disposition: Possible d/c tomorrow if LFTs trending down, afebrile, clinically feeling good    Interval History    Feeling better, reports some mild RLQ \"cramps\"; no N/V, no chest pain, no SOB; feels abdomen is mildly distended, " passing gas, no BM; discussed with GI, bedside RN and with ; multiple questions addressed     -Data reviewed today: I reviewed all new labs and imaging results over the last 24 hours. I personally reviewed- US abdomen- Liver is course in echogenicity without focal lesions.    Physical Exam   Temp: 97.4  F (36.3  C) Temp src: Oral BP: 127/73   Heart Rate: 58 Resp: 16 SpO2: 96 % O2 Device: None (Room air)    Vitals:    03/08/17 0354 03/09/17 0546 03/10/17 0457   Weight: 101.5 kg (223 lb 12.8 oz) 102 kg (224 lb 13.9 oz) 101.9 kg (224 lb 10.4 oz)     Vital Signs with Ranges  Temp:  [97.4  F (36.3  C)] 97.4  F (36.3  C)  Heart Rate:  [58-59] 58  Resp:  [16] 16  BP: ()/(57-73) 127/73  SpO2:  [96 %] 96 %  I/O last 3 completed shifts:  In: 537 [P.O.:300; I.V.:237]  Out: -     Constitutional: AA O X3, NAD  HEENT:Moist oral mucosa, no oral lesions, No pallor or icterus  Neck- Supple, Good ROM, No JVD  Respiratory:  No crackles, No wheezes, CTA B/L, Normal WOB  Cardiovascular: RRR, No murmur  GI: Soft, nontender, mild distended, BS present, no rebound  Skin/Integument: Warm and dry, no rashes  MSK: No joint deformity or swelling, no edema  Neuro: CN- grossly intact    Medications        levofloxacin  500 mg Oral Daily     metroNIDAZOLE  500 mg Oral Q8H NANDO     aspirin EC  81 mg Oral Daily     FLUoxetine (PROzac) capsule 40 mg  40 mg Oral Daily     omeprazole (priLOSEC) CR capsule 20 mg  20 mg Oral QAM     LORazepam  0.5 mg Intravenous Once       Data     Recent Labs  Lab 03/10/17  0924 03/10/17  0710 03/09/17  0729 03/08/17  0710  03/06/17  1115   WBC  --  5.1 4.7 6.6  < >  --    HGB  --  9.0* 9.1* 8.9*  < >  --    MCV  --  76* 76* 76*  < >  --    PLT  --  200 195 163  < >  --    INR 1.04  --   --   --   --  1.20*   NA  --  143 142 143  < >  --    POTASSIUM  --  3.9 3.4 3.5  < >  --    CHLORIDE  --  109 109 110*  < >  --    CO2  --  26 25 27  < >  --    BUN  --  10 9 8  < >  --    CR  --  0.72 0.72 0.80  < >  --     ANIONGAP  --  8 8 6  < >  --    DULCE  --  8.6 8.6 8.8  < >  --    GLC  --  101* 101* 97  < >  --    ALBUMIN  --  2.1* 2.1* 2.2*  < >  --    PROTTOTAL  --  5.9* 6.1* 6.2*  < >  --    BILITOTAL  --  0.2 0.4 0.5  < >  --    ALKPHOS  --  268* 247* 168*  < >  --    ALT  --  222* 215* 111*  < >  --    AST  --  211* 247* 65*  < >  --    < > = values in this interval not displayed.    Recent Results (from the past 24 hour(s))   US Abdomen Limited    Narrative    ULTRASOUND ABDOMEN LIMITED  3/10/2017 8:48 AM     HISTORY: Abnormal LFTs. Rule out liver pathology.    COMPARISON: None.    FINDINGS:  Liver is course in echogenicity without focal lesions.  Gallbladder is normal without stones or sludge. Extrahepatic bile duct  is normal in diameter. Pancreas is normal where visualized. Right  kidney is normal in size. There is no hydronephrosis. Trace ascites.      Impression    IMPRESSION:    1. Coarse echogenicity of the liver which is nonspecific.  2. Trace ascites.

## 2017-03-10 NOTE — PROGRESS NOTES
[facility]    Infectious Disease Progress Note    Date of Service (when I saw the patient): 03/10/2017     Assessment & Plan   Rosalia Schulz is a 51 year old female who was admitted on 3/6/2017.     Impression:   1 51 y.o with h/o endometrial adenocarcinoma with total abdominal hysterectomy and bilateral salpingo-oophorectomy with lymphadenectomy done on 2/23.   2 Admitted this occasion with fever, abdominal pain, leucocytosis and CT abdomen with fluid collection concerning for abscess.    decrease in the size of fluid collection on the repeat CT scan, ? Due to spontaneous vaginal drainage.     3 blood Cultures from [place] positive for Gram positive rods in the anaerobic cultures, ? Clostridium. Follow up cultures here are NGTD but given the one from [place] is an anaerobe it might still get positive.  4 Abnormal LFTs thru course, not at all likely due to antibiotics(abnormal before started), now AST higher     Recommendations:   1 No objection empiric switch off augmentin, to lev/.flaghyl 10 days, home when other issues allow  2 Follow fever, LFTs, sxs, if more fever or worsens Addison Post MD    Interval History   T max under 99  Resolution of some of the fluid seen in the CT scan previously with ? Vag fistula drainage.   Cultures from [place] positive for Gram positive rods in the anaerobic cultures, ? Clostridium  Follow up cultures here are negative   LFTs abnormal thru admit AST higher now with 1 dose augmentin but highly doubt it as cause  US done pending    Physical Exam   Temp: 97.4  F (36.3  C) Temp src: Oral BP: 127/73   Heart Rate: 58 Resp: 16 SpO2: 96 % O2 Device: None (Room air)    Vitals:    03/08/17 0354 03/09/17 0546 03/10/17 0457   Weight: 101.5 kg (223 lb 12.8 oz) 102 kg (224 lb 13.9 oz) 101.9 kg (224 lb 10.4 oz)     Vital Signs with Ranges  Temp:  [97.4  F (36.3  C)] 97.4  F (36.3  C)  Heart Rate:  [58-59] 58  Resp:  [16] 16  BP:  ()/(57-73) 127/73  SpO2:  [96 %] 96 %    Constitutional: Awake, alert, cooperative, no apparent distress  Lungs: Clear to auscultation bilaterally, no crackles or wheezing  Cardiovascular: Regular rate and rhythm, normal S1 and S2, and no murmur noted  Abdomen: Normal bowel sounds, soft, non-distended, swl lower abd tender  Skin: No rashes, no cyanosis, no edema  Other:    Medications     dextrose 5% and 0.45% NaCl 50 mL/hr at 03/09/17 1110       levofloxacin  500 mg Oral Daily     metroNIDAZOLE  500 mg Oral Q8H NANDO     aspirin EC  81 mg Oral Daily     FLUoxetine (PROzac) capsule 40 mg  40 mg Oral Daily     omeprazole (priLOSEC) CR capsule 20 mg  20 mg Oral QAM     LORazepam  0.5 mg Intravenous Once       Data   All microbiology laboratory data reviewed.  Recent Labs   Lab Test  03/10/17   0710  03/09/17   0729  03/08/17   0710   WBC  5.1  4.7  6.6   HGB  9.0*  9.1*  8.9*   HCT  28.9*  29.5*  28.6*   MCV  76*  76*  76*   PLT  200  195  163     Recent Labs   Lab Test  03/10/17   0710  03/09/17   0729  03/08/17   0710   CR  0.72  0.72  0.80     No lab results found.  Recent Labs   Lab Test  03/07/17   0755   CULT  No growth after 3 days

## 2017-03-10 NOTE — PLAN OF CARE
Problem: Goal Outcome Summary  Goal: Goal Outcome Summary  Outcome: Improving  Afebrile.  Ibuprofen given for pain.  GI consult.  Abdominal ultrasound negative.  Likely discharge tomorrow pending liver functions.  Up with standby assist.

## 2017-03-11 VITALS
HEIGHT: 66 IN | DIASTOLIC BLOOD PRESSURE: 54 MMHG | RESPIRATION RATE: 16 BRPM | HEART RATE: 66 BPM | BODY MASS INDEX: 36.56 KG/M2 | SYSTOLIC BLOOD PRESSURE: 115 MMHG | TEMPERATURE: 96.5 F | OXYGEN SATURATION: 97 % | WEIGHT: 227.51 LBS

## 2017-03-11 LAB
ALBUMIN SERPL-MCNC: 2.2 G/DL (ref 3.4–5)
ALP SERPL-CCNC: 263 U/L (ref 40–150)
ALT SERPL W P-5'-P-CCNC: 171 U/L (ref 0–50)
AST SERPL W P-5'-P-CCNC: 97 U/L (ref 0–45)
BILIRUB DIRECT SERPL-MCNC: 0.1 MG/DL (ref 0–0.2)
BILIRUB SERPL-MCNC: 0.2 MG/DL (ref 0.2–1.3)
CMV DNA SPEC NAA+PROBE-ACNC: NORMAL [IU]/ML
CMV DNA SPEC NAA+PROBE-LOG#: NORMAL {LOG_IU}/ML
CREAT SERPL-MCNC: 0.62 MG/DL (ref 0.52–1.04)
EBV DNA # SPEC NAA+PROBE: ABNORMAL {COPIES}/ML
EBV DNA SPEC NAA+PROBE-LOG#: ABNORMAL {LOG_COPIES}/ML
GFR SERPL CREATININE-BSD FRML MDRD: NORMAL ML/MIN/1.7M2
HAV IGG SER QL IA: ABNORMAL
HBV SURFACE AB SERPL IA-ACNC: NORMAL M[IU]/ML
PROT SERPL-MCNC: 6.3 G/DL (ref 6.8–8.8)
SMA IGG SER-ACNC: 7
SPECIMEN SOURCE: NORMAL

## 2017-03-11 PROCEDURE — 25000132 ZZH RX MED GY IP 250 OP 250 PS 637: Performed by: INTERNAL MEDICINE

## 2017-03-11 PROCEDURE — 82565 ASSAY OF CREATININE: CPT | Performed by: INTERNAL MEDICINE

## 2017-03-11 PROCEDURE — 99239 HOSP IP/OBS DSCHRG MGMT >30: CPT | Performed by: INTERNAL MEDICINE

## 2017-03-11 PROCEDURE — 36415 COLL VENOUS BLD VENIPUNCTURE: CPT | Performed by: INTERNAL MEDICINE

## 2017-03-11 PROCEDURE — 80076 HEPATIC FUNCTION PANEL: CPT | Performed by: INTERNAL MEDICINE

## 2017-03-11 PROCEDURE — 99207 ZZC NO CHARGE VISIT/PATIENT NOT SEEN: CPT | Performed by: INTERNAL MEDICINE

## 2017-03-11 PROCEDURE — 83516 IMMUNOASSAY NONANTIBODY: CPT | Performed by: INTERNAL MEDICINE

## 2017-03-11 RX ORDER — METRONIDAZOLE 500 MG/1
500 TABLET ORAL EVERY 8 HOURS
Qty: 28 TABLET | Refills: 0 | Status: SHIPPED | OUTPATIENT
Start: 2017-03-11 | End: 2017-03-21

## 2017-03-11 RX ORDER — LEVOFLOXACIN 500 MG/1
500 TABLET, FILM COATED ORAL DAILY
Qty: 10 TABLET | Refills: 0 | Status: SHIPPED | OUTPATIENT
Start: 2017-03-12 | End: 2017-03-22

## 2017-03-11 RX ADMIN — ASPIRIN 81 MG: 81 TABLET, COATED ORAL at 08:45

## 2017-03-11 RX ADMIN — FLUOXETINE HYDROCHLORIDE 40 MG: 20 CAPSULE ORAL at 08:44

## 2017-03-11 RX ADMIN — LEVOFLOXACIN 500 MG: 500 TABLET, FILM COATED ORAL at 08:44

## 2017-03-11 RX ADMIN — OMEPRAZOLE 20 MG: 20 CAPSULE, DELAYED RELEASE ORAL at 08:44

## 2017-03-11 RX ADMIN — METRONIDAZOLE 500 MG: 500 TABLET ORAL at 15:36

## 2017-03-11 RX ADMIN — METRONIDAZOLE 500 MG: 500 TABLET ORAL at 06:49

## 2017-03-11 RX ADMIN — Medication 3 MG: at 01:01

## 2017-03-11 ASSESSMENT — PAIN DESCRIPTION - DESCRIPTORS: DESCRIPTORS: ACHING

## 2017-03-11 NOTE — PROGRESS NOTES
MD Notification    Notified Person:  MD    Notified Persons Name: Dr. Osman    Notification Date/Time: 3/11/2017 1245    Notification Interaction:  Talked with Physician    Purpose of Notification: Pt unable to sleep    Orders Received: Re-ordered prn Melatonin at bedtime.     Comments:

## 2017-03-11 NOTE — PROGRESS NOTES
Discharge Rx e-scribed to requested pharmacy, instructions reviewed with patient and spouse. Spouse collected belongings, d/c via w/c escorted by CNA, home with spouse.

## 2017-03-11 NOTE — DISCHARGE SUMMARY
DATE OF ADMISSION:  3/6/2017.  DATE OF DISCHARGE:  3/11/2017.      DISCHARGE DIAGNOSES:   1.  Postoperative intra-abdominal infection with sepsis.   2.  History of endometrial carcinoma status post total abdominal hysterectomy, bilateral salpingo-oophorectomy and lymphadenectomy.   3.  Elevated LFTs of unclear etiology.   4.  History of cerebrovascular accident.   5.  Iron deficiency anemia.   6.  Depression.   7.  Gastroesophageal reflux disease.   8.  Migraine headaches.   9.  Obstructive sleep apnea.      DISCHARGE MEDICATIONS:  As follows;   1.  Levaquin 500 mg daily for another 10 days starting on 3/12/2017.   2.  Metronidazole 500 mg every 8 hours for 28 more doses.   3.  Multivitamin 1 tab daily.   4.  Ibuprofen 600 mg every 6 hours as needed for pain.   5.  Fluoxetine 40 mg daily.   6.  Imitrex 50 mg by mouth, may repeat after 2 hours with a maximum dose of 200 mg in 24 hours for headaches.   7.  Aspirin 81 mg daily.   8.  Omeprazole 20 mg daily.   9.  Atorvastatin calcium 10 mg daily.      HOSPITAL COURSE:  Rosalia Schulz is a 51-year-old woman who had a recent total abdominal hysterectomy, bilateral salpingo-oophorectomy and lymphadenectomy for endometrial adenocarcinoma which was performed on 2/23/2017.  The patient began developing fevers by 3/3, patient was febrile to 103.5.  The patient had a CT scan that reported a fluid collection but no definitive abscess; she was started on broad-spectrum antibiotics with vancomycin, Zosyn.  GYN Oncology Service was consulted as well as ID service; it was felt that her pelvic fluid was decreasing in size with antibiotics and thus no drainage was required.  The patient had mildly elevated LFTs and therefore Gastroenterology Service was consulted.  Her LFTs are showing an improvement in her transaminases, alkaline phosphatase is also slightly improved today compared to yesterday at 263 versus 268.  The patient has been cleared for discharge by Infectious Disease  and GYN Oncology services, Gastroenterology had recommended outpatient followup.  I have given the patient the number for [facility] and she and her  will make a followup appointment with [facility].       EXAM:     GENERAL:  On day of discharge, she is awake, alert and oriented in no acute distress.   LUNGS:  Clear.   CARDIOVASCULAR EXAM:  Regular rate and rhythm, S1, S2.   ABDOMEN:  Bowel sounds positive, nontender, nondistended.        Patient wishes to discharge to home, she will follow up with her own primary care physician, with GYN Oncology with Gastroenterology services.      40 minutes of discharge planning and cares.         BROOKLYN GABRIEL MD             D: 2017 15:28   T: 2017 16:35   MT: [id number]      Name:     CASPER KOROMA   MRN:      [id number]        Account:        [id number]   :      1965           Admit Date:                                       Discharge Date: 2017      Document: [id number]       cc: Tiffanie ADKINS

## 2017-03-11 NOTE — PROGRESS NOTES
[facility]    Infectious Disease Progress Note    Date of Service (when I saw the patient): 03/11/2017     Assessment & Plan   Rosalia Schulz is a 51 year old female who was admitted on 3/6/2017.     Impression:   1 51 y.o with h/o endometrial adenocarcinoma with total abdominal hysterectomy and bilateral salpingo-oophorectomy with lymphadenectomy done on 2/23.   2 Admitted this occasion with fever, abdominal pain, leucocytosis and CT abdomen with fluid collection concerning for abscess.    decrease in the size of fluid collection on the repeat CT scan, ? Due to spontaneous vaginal drainage.     3 blood Cultures from [facility] positive for anaerobe, now also 1 bottle ? GPC, no ID  4 Abnormal LFTs thru course, not at all likely due to antibiotics(abnormal before started), now AST higher, neg US     Recommendations:   1 No objection empiric switch off augmentin, to lev/.flaghyl 10 days, home when other issues allow  2 Follow fever, LFTs, sxs, if more fever or worsens reimage and if still collection, drain  3 OK home, Bc not likely an issue by itself, simply a marker of the pelvic infection         Douglas Post MD    Interval History   T max under 99  Resolution of some of the fluid seen in the CT scan previously with ? Vag fistula drainage.   Cultures from [facility] positive for Gram positive rods in the anaerobic cultures, ? Clostridium  Follow up cultures here are negative   LFTs abnormal thru admit AST higher now with 1 dose augmentin but highly doubt it as cause  US done pending    Physical Exam   Temp: 97.6  F (36.4  C) Temp src: Oral BP: 115/67 Pulse: 66 Heart Rate: 66 Resp: 16 SpO2: 99 % O2 Device: None (Room air)    Vitals:    03/09/17 0546 03/10/17 0457 03/11/17 0543   Weight: 102 kg (224 lb 13.9 oz) 101.9 kg (224 lb 10.4 oz) 103.2 kg (227 lb 8.2 oz)     Vital Signs with Ranges  Temp:  [97.6  F (36.4  C)-98.7  F (37.1  C)] 97.6  F (36.4  C)  Pulse:  [62-66] 66  Heart Rate:  [64-66]  66  Resp:  [16-18] 16  BP: (115-143)/(65-74) 115/67  SpO2:  [96 %-99 %] 99 %    Constitutional: Awake, alert, cooperative, no apparent distress  Lungs: Clear to auscultation bilaterally, no crackles or wheezing  Cardiovascular: Regular rate and rhythm, normal S1 and S2, and no murmur noted  Abdomen: Normal bowel sounds, soft, non-distended, swl lower abd tender  Skin: No rashes, no cyanosis, no edema  Other:    Medications        levofloxacin  500 mg Oral Daily     metroNIDAZOLE  500 mg Oral Q8H NANDO     sodium chloride (PF)  3 mL Intracatheter Q8H     aspirin EC  81 mg Oral Daily     FLUoxetine (PROzac) capsule 40 mg  40 mg Oral Daily     omeprazole (priLOSEC) CR capsule 20 mg  20 mg Oral QAM     LORazepam  0.5 mg Intravenous Once       Data   All microbiology laboratory data reviewed.  Recent Labs   Lab Test  03/10/17   0710  03/09/17   0729  03/08/17   0710   WBC  5.1  4.7  6.6   HGB  9.0*  9.1*  8.9*   HCT  28.9*  29.5*  28.6*   MCV  76*  76*  76*   PLT  200  195  163     Recent Labs   Lab Test  03/10/17   0710  03/09/17   0729  03/08/17   0710   CR  0.72  0.72  0.80     No lab results found.  Recent Labs   Lab Test  03/07/17   0755   CULT  No growth after 4 days

## 2017-03-11 NOTE — PLAN OF CARE
Problem: Goal Outcome Summary  Goal: Goal Outcome Summary  Outcome: Improving  Pt A/O. VSS. No c/o pain. On BiPAP overnight. Up independently. Trouble falling asleep, PRN melatonin given - effective. Plan to DC home today pending liver function.

## 2017-03-11 NOTE — PROGRESS NOTES
HOSPITALIST CROSS COVER NOTE.    Called regarding insomnia.    Patient would like melatonin. For unclear reasons this was stopped.    Melatonin 3 mg at bedtime again ordered.    ROXANNA WALSH MD

## 2017-03-11 NOTE — PLAN OF CARE
Problem: Goal Outcome Summary  Goal: Goal Outcome Summary  Outcome: Improving  Pt given ibuprofen x2 for pain (hips, back, and abd). Pt feels much improved and is hopeful to d/c tomorrow. VSS. Ambulated in halls x2.

## 2017-03-11 NOTE — PROGRESS NOTES
"[facility]  Hospitalist Progress Note          Assessment and Plan:    1. Abdominal infection/Sepsis (H):  No residual fevers or pain.  Still having some vaginal drainage which can occur after surgery.  On oral antibiotics.  OK from Gyn Onc to discharge to home today.  She has a f/u appt with us in 8 days.  We will recheck WBC and LFTs  2.  Abn LFT's:  Awaiting results.  No obvious cause.  If remain elevated will refer to [facility] as an outpatient  3.  Anemia of chronic disease:  Will follow                 Interval History:   no complaints and doing well; no cp, sob, n/v/d, or abd pain.              Medications:   I have reviewed this patient's current medications               Physical Exam:   Blood pressure 115/67, pulse 66, temperature 97.6  F (36.4  C), temperature source Oral, resp. rate 16, height 1.676 m (5' 6\"), weight 103.2 kg (227 lb 8.2 oz), SpO2 99 %.        Vital Sign Ranges  Temperature Temp  Av.3  F (36.8  C)  Min: 97.6  F (36.4  C)  Max: 98.7  F (37.1  C)   Blood pressure Systolic (24hrs), Av , Min:115 , Max:143        Diastolic (24hrs), Av, Min:65, Max:74      Pulse Pulse  Av  Min: 62  Max: 66   Respirations Resp  Av.4  Min: 16  Max: 18   Pulse oximetry SpO2  Av.2 %  Min: 96 %  Max: 99 %         Intake/Output Summary (Last 24 hours) at 17 1108  Last data filed at 03/10/17 1430   Gross per 24 hour   Intake             1120 ml   Output                0 ml   Net             1120 ml       Abdomen:   Soft, non-tender, incisions fine                Data:   All laboratory data reviewed  "

## 2017-03-13 LAB
ANA SER QL IA: 6
BACTERIA SPEC CULT: NO GROWTH
EBV DNA # SPEC NAA+PROBE: NORMAL {COPIES}/ML
EBV DNA SPEC NAA+PROBE-LOG#: NORMAL {LOG_COPIES}/ML
HAV IGG SER QL IA: NORMAL
HBV SURFACE AB SERPL IA-ACNC: 1.36 M[IU]/ML
Lab: NORMAL
MICRO REPORT STATUS: NORMAL
SPECIMEN SOURCE: NORMAL

## 2017-03-14 LAB — MITOCHONDRIA M2 IGG SER-ACNC: 53 U/ML

## 2018-01-07 ENCOUNTER — HEALTH MAINTENANCE LETTER (OUTPATIENT)
Age: 53
End: 2018-01-07

## 2019-08-08 ENCOUNTER — HOSPITAL ENCOUNTER (OUTPATIENT)
Dept: CT IMAGING | Facility: CLINIC | Age: 54
Discharge: HOME | End: 2019-08-08
Attending: OBSTETRICS & GYNECOLOGY | Admitting: OBSTETRICS & GYNECOLOGY
Payer: COMMERCIAL

## 2019-08-08 DIAGNOSIS — C54.1 ENDOMETRIAL CANCER (H): ICD-10-CM

## 2019-08-08 PROCEDURE — 71260 CT THORAX DX C+: CPT

## 2019-08-08 PROCEDURE — 25000125 ZZHC RX 250: Performed by: OBSTETRICS & GYNECOLOGY

## 2019-08-08 PROCEDURE — 25000128 H RX IP 250 OP 636: Performed by: OBSTETRICS & GYNECOLOGY

## 2019-08-08 PROCEDURE — 74177 CT ABD & PELVIS W/CONTRAST: CPT

## 2019-08-08 RX ORDER — IOPAMIDOL 755 MG/ML
111 INJECTION, SOLUTION INTRAVASCULAR ONCE
Status: DISCONTINUED | OUTPATIENT
Start: 2019-08-08 | End: 2019-08-08

## 2019-08-08 RX ORDER — IOPAMIDOL 755 MG/ML
111 INJECTION, SOLUTION INTRAVASCULAR ONCE
Status: COMPLETED | OUTPATIENT
Start: 2019-08-08 | End: 2019-08-08

## 2019-08-08 RX ADMIN — IOPAMIDOL 111 ML: 755 INJECTION, SOLUTION INTRAVENOUS at 17:21

## 2019-08-08 RX ADMIN — SODIUM CHLORIDE 72 ML: 9 INJECTION, SOLUTION INTRAVENOUS at 17:22

## 2020-03-10 ENCOUNTER — HEALTH MAINTENANCE LETTER (OUTPATIENT)
Age: 55
End: 2020-03-10

## 2020-12-27 ENCOUNTER — HEALTH MAINTENANCE LETTER (OUTPATIENT)
Age: 55
End: 2020-12-27

## 2021-04-24 ENCOUNTER — HEALTH MAINTENANCE LETTER (OUTPATIENT)
Age: 56
End: 2021-04-24

## 2021-10-09 ENCOUNTER — HEALTH MAINTENANCE LETTER (OUTPATIENT)
Age: 56
End: 2021-10-09

## 2022-03-26 ENCOUNTER — HEALTH MAINTENANCE LETTER (OUTPATIENT)
Age: 57
End: 2022-03-26

## 2022-05-16 ENCOUNTER — HEALTH MAINTENANCE LETTER (OUTPATIENT)
Age: 57
End: 2022-05-16

## 2022-09-11 ENCOUNTER — HEALTH MAINTENANCE LETTER (OUTPATIENT)
Age: 57
End: 2022-09-11

## 2023-06-03 ENCOUNTER — HEALTH MAINTENANCE LETTER (OUTPATIENT)
Age: 58
End: 2023-06-03

## (undated) DEVICE — GLOVE BIOGEL PI ULTRATOUCH G SZ 6.5 42165

## (undated) DEVICE — DAVINCI XI NDL DRIVER LARGE 470006

## (undated) DEVICE — CATH TRAY FOLEY SURESTEP 16FR WDRAIN BAG STLK LATEX A300316A

## (undated) DEVICE — SUCTION CANISTER MEDIVAC LINER 3000ML W/LID 65651-530

## (undated) DEVICE — TUBING CONMED AIRSEAL SMOKE EVAC INSUFFLATION ASM-EVAC

## (undated) DEVICE — DAVINCI XI GRASPER ENDOWRIST PROGRASP 470093

## (undated) DEVICE — SPONGE LAP 18X18" X8435

## (undated) DEVICE — LINEN TOWEL PACK X5 5464

## (undated) DEVICE — DRSG STERI STRIP 1X5" R1548

## (undated) DEVICE — POUCH TISSUE RETRIEVAL LAP 10MM 2.21" INTRO TRS100SB2

## (undated) DEVICE — SU PDS II 0 CT-2 27" Z334H

## (undated) DEVICE — DAVINCI XI DRAPE ARM 470015

## (undated) DEVICE — DAVINCI TROCAR 8MM BLADELESS 470357

## (undated) DEVICE — SU VICRYL 0 CT-2 27" J334H

## (undated) DEVICE — NDL INSUFFLATION 120MM VERRES 172015

## (undated) DEVICE — SU MONOCRYL 4-0 PS-2 18" UND Y496G

## (undated) DEVICE — GLOVE PROTEXIS MICRO 6.5  2D73PM65

## (undated) DEVICE — GLOVE PROTEXIS BLUE W/NEU-THERA 7.0  2D73EB70

## (undated) DEVICE — DAVINCI XI ESU FCP BIPOLAR MARYLAND 470172

## (undated) DEVICE — SU VICRYL 0 CT-1 27" J340H

## (undated) DEVICE — DAVINCI HOT SHEARS TIP COVER  400180

## (undated) DEVICE — KIT PATIENT POSITIONING PIGAZZI LATEX FREE 40580

## (undated) DEVICE — ESU GROUND PAD UNIVERSAL W/O CORD

## (undated) DEVICE — DAVINCI XI MONOPOLAR SCISSORS HOT SHEARS 8MM 470179

## (undated) DEVICE — SOL NACL 0.9% INJ 1000ML BAG 2B1324X

## (undated) DEVICE — SOL NACL 0.9% IRRIG 1000ML BOTTLE 07138-09

## (undated) DEVICE — DRAPE CV SPLIT 110X36" 89452

## (undated) DEVICE — TROCAR AIRSEAL BLADELESS 12X120MM IAS12-120

## (undated) DEVICE — SUCTION IRR TRUMPET VALVE LAP ASU1201

## (undated) DEVICE — CLIP ENDO HEMO-LOC PURPLE LG 544240

## (undated) DEVICE — DAVINCI XI DRAPE COLUMN 470341

## (undated) DEVICE — DAVINCI XI SEAL UNIVERSAL 5-8MM 470361

## (undated) DEVICE — PACK DAVINCI GYN SMA15GDFS1

## (undated) DEVICE — SPONGE RAY-TEC 4X4" 7317

## (undated) RX ORDER — LIDOCAINE HYDROCHLORIDE 20 MG/ML
INJECTION, SOLUTION EPIDURAL; INFILTRATION; INTRACAUDAL; PERINEURAL
Status: DISPENSED
Start: 2017-02-23

## (undated) RX ORDER — FENTANYL CITRATE 50 UG/ML
INJECTION, SOLUTION INTRAMUSCULAR; INTRAVENOUS
Status: DISPENSED
Start: 2017-02-23

## (undated) RX ORDER — ONDANSETRON 2 MG/ML
INJECTION INTRAMUSCULAR; INTRAVENOUS
Status: DISPENSED
Start: 2017-02-23

## (undated) RX ORDER — DEXAMETHASONE SODIUM PHOSPHATE 4 MG/ML
INJECTION, SOLUTION INTRA-ARTICULAR; INTRALESIONAL; INTRAMUSCULAR; INTRAVENOUS; SOFT TISSUE
Status: DISPENSED
Start: 2017-02-23

## (undated) RX ORDER — PROPOFOL 10 MG/ML
INJECTION, EMULSION INTRAVENOUS
Status: DISPENSED
Start: 2017-02-23

## (undated) RX ORDER — KETOROLAC TROMETHAMINE 30 MG/ML
INJECTION, SOLUTION INTRAMUSCULAR; INTRAVENOUS
Status: DISPENSED
Start: 2017-02-23

## (undated) RX ORDER — CEFAZOLIN SODIUM 2 G/100ML
INJECTION, SOLUTION INTRAVENOUS
Status: DISPENSED
Start: 2017-02-23

## (undated) RX ORDER — BUPIVACAINE HYDROCHLORIDE AND EPINEPHRINE 5; 5 MG/ML; UG/ML
INJECTION, SOLUTION EPIDURAL; INTRACAUDAL; PERINEURAL
Status: DISPENSED
Start: 2017-02-23

## (undated) RX ORDER — OXYCODONE HYDROCHLORIDE 5 MG/1
TABLET ORAL
Status: DISPENSED
Start: 2017-02-23

## (undated) RX ORDER — OXYCODONE AND ACETAMINOPHEN 5; 325 MG/1; MG/1
TABLET ORAL
Status: DISPENSED
Start: 2017-02-23

## (undated) RX ORDER — ACETAMINOPHEN 10 MG/ML
INJECTION, SOLUTION INTRAVENOUS
Status: DISPENSED
Start: 2017-02-23

## (undated) RX ORDER — GLYCOPYRROLATE 0.2 MG/ML
INJECTION, SOLUTION INTRAMUSCULAR; INTRAVENOUS
Status: DISPENSED
Start: 2017-02-23